# Patient Record
Sex: MALE | Race: WHITE | Employment: FULL TIME | URBAN - METROPOLITAN AREA
[De-identification: names, ages, dates, MRNs, and addresses within clinical notes are randomized per-mention and may not be internally consistent; named-entity substitution may affect disease eponyms.]

---

## 2025-05-07 ENCOUNTER — HOSPITAL ENCOUNTER (INPATIENT)
Age: 35
LOS: 5 days | Discharge: ANOTHER HOSPITAL | DRG: 836 | End: 2025-05-12
Attending: INTERNAL MEDICINE | Admitting: HOSPITALIST
Payer: COMMERCIAL

## 2025-05-07 ENCOUNTER — APPOINTMENT (OUTPATIENT)
Dept: ULTRASOUND IMAGING | Age: 35
End: 2025-05-07
Payer: COMMERCIAL

## 2025-05-07 ENCOUNTER — HOSPITAL ENCOUNTER (EMERGENCY)
Age: 35
Discharge: ANOTHER HOSPITAL | End: 2025-05-07
Attending: STUDENT IN AN ORGANIZED HEALTH CARE EDUCATION/TRAINING PROGRAM
Payer: COMMERCIAL

## 2025-05-07 ENCOUNTER — APPOINTMENT (OUTPATIENT)
Dept: CT IMAGING | Age: 35
End: 2025-05-07
Payer: COMMERCIAL

## 2025-05-07 ENCOUNTER — APPOINTMENT (OUTPATIENT)
Dept: GENERAL RADIOLOGY | Age: 35
End: 2025-05-07
Payer: COMMERCIAL

## 2025-05-07 VITALS
TEMPERATURE: 98.5 F | HEIGHT: 70 IN | BODY MASS INDEX: 25.77 KG/M2 | HEART RATE: 102 BPM | WEIGHT: 180 LBS | DIASTOLIC BLOOD PRESSURE: 73 MMHG | OXYGEN SATURATION: 94 % | SYSTOLIC BLOOD PRESSURE: 143 MMHG | RESPIRATION RATE: 21 BRPM

## 2025-05-07 DIAGNOSIS — D64.9 ANEMIA, UNSPECIFIED TYPE: ICD-10-CM

## 2025-05-07 DIAGNOSIS — C92.00 ACUTE MYELOID LEUKEMIA NOT HAVING ACHIEVED REMISSION (HCC): Primary | ICD-10-CM

## 2025-05-07 DIAGNOSIS — D72.829 LEUKOCYTOSIS, UNSPECIFIED TYPE: ICD-10-CM

## 2025-05-07 DIAGNOSIS — D75.839 THROMBOCYTOSIS: ICD-10-CM

## 2025-05-07 DIAGNOSIS — R53.83 OTHER FATIGUE: Primary | ICD-10-CM

## 2025-05-07 PROBLEM — C92.10 CML (CHRONIC MYELOID LEUKEMIA) (HCC): Status: ACTIVE | Noted: 2025-05-07

## 2025-05-07 LAB
ALBUMIN SERPL-MCNC: 4.2 G/DL (ref 3.5–5)
ALBUMIN/GLOB SERPL: 1.2 (ref 1–1.9)
ALP SERPL-CCNC: 90 U/L (ref 40–129)
ALT SERPL-CCNC: 17 U/L (ref 8–55)
ANION GAP SERPL CALC-SCNC: 14 MMOL/L (ref 7–16)
AST SERPL-CCNC: 32 U/L (ref 15–37)
BASOPHILS # BLD: 8.11 K/UL (ref 0–0.2)
BASOPHILS NFR BLD MANUAL: 2 % (ref 0–2)
BILIRUB SERPL-MCNC: 0.4 MG/DL (ref 0–1.2)
BILIRUB UR QL: NEGATIVE
BLASTS NFR BLD MANUAL: 10 %
BUN SERPL-MCNC: 19 MG/DL (ref 6–23)
CALCIUM SERPL-MCNC: 9.8 MG/DL (ref 8.8–10.2)
CHLORIDE SERPL-SCNC: 101 MMOL/L (ref 98–107)
CK SERPL-CCNC: 54 U/L (ref 21–215)
CO2 SERPL-SCNC: 26 MMOL/L (ref 20–29)
CREAT SERPL-MCNC: 0.98 MG/DL (ref 0.8–1.3)
DIFFERENTIAL METHOD BLD: ABNORMAL
EOSINOPHIL # BLD: 12.17 K/UL (ref 0–0.8)
EOSINOPHIL NFR BLD MANUAL: 3 % (ref 1–8)
ERYTHROCYTE [DISTWIDTH] IN BLOOD BY AUTOMATED COUNT: 17.2 % (ref 11.9–14.6)
FLUAV RNA SPEC QL NAA+PROBE: NOT DETECTED
FLUBV RNA SPEC QL NAA+PROBE: NOT DETECTED
GLOBULIN SER CALC-MCNC: 3.4 G/DL (ref 2.3–3.5)
GLUCOSE SERPL-MCNC: 109 MG/DL (ref 70–99)
GLUCOSE UR QL STRIP.AUTO: NEGATIVE MG/DL
HAV IGM SER QL: NONREACTIVE
HBV CORE IGM SER QL: NONREACTIVE
HBV SURFACE AG SER QL: NONREACTIVE
HCT VFR BLD AUTO: 24 % (ref 41.1–50.3)
HCV AB SER QL: NONREACTIVE
HGB BLD-MCNC: 8.3 G/DL (ref 13.6–17.2)
HIV 1+2 AB+HIV1 P24 AG SERPL QL IA: NONREACTIVE
HIV 1/2 RESULT COMMENT: NORMAL
KETONES UR-MCNC: NEGATIVE MG/DL
LACTATE SERPL-SCNC: 1 MMOL/L (ref 0.5–2)
LDH SERPL L TO P-CCNC: 1271 U/L (ref 127–281)
LEUKOCYTE ESTERASE UR QL STRIP: NEGATIVE
LYMPHOCYTES # BLD: 4.06 K/UL (ref 0.5–4.6)
LYMPHOCYTES NFR BLD MANUAL: 1 % (ref 16–44)
MAGNESIUM SERPL-MCNC: 2.2 MG/DL (ref 1.8–2.4)
MCH RBC QN AUTO: 35.3 PG (ref 26.1–32.9)
MCHC RBC AUTO-ENTMCNC: 34.6 G/DL (ref 31.4–35)
MCV RBC AUTO: 102.1 FL (ref 82–102)
METAMYELOCYTES NFR BLD MANUAL: 6 %
MONOCYTES # BLD: 4.06 K/UL (ref 0.1–1.3)
MONOCYTES NFR BLD MANUAL: 1 % (ref 3–9)
MYELOCYTES NFR BLD MANUAL: 17 %
NEUTS BAND NFR BLD MANUAL: 17 % (ref 0–6)
NEUTS SEG # BLD: 336.65 K/UL (ref 1.7–8.2)
NEUTS SEG NFR BLD MANUAL: 35 % (ref 47–75)
NITRITE UR QL: NEGATIVE
NRBC # BLD: 1.68 K/UL (ref 0–0.2)
PH UR: 6 (ref 5–9)
PLATELET # BLD AUTO: 1059 K/UL (ref 150–450)
PLATELET COMMENT: ABNORMAL
PMV BLD AUTO: 8.9 FL (ref 9.4–12.3)
POTASSIUM SERPL-SCNC: 3.8 MMOL/L (ref 3.5–5.1)
PROCALCITONIN SERPL-MCNC: 0.32 NG/ML (ref 0–0.1)
PROMYELOCYTES NFR BLD MANUAL: 8 %
PROT SERPL-MCNC: 7.5 G/DL (ref 6.3–8.2)
PROT UR QL: NEGATIVE MG/DL
RBC # BLD AUTO: 2.35 M/UL (ref 4.23–5.6)
RBC # UR STRIP: NEGATIVE
RBC MORPH BLD: ABNORMAL
SARS-COV-2 RDRP RESP QL NAA+PROBE: NOT DETECTED
SERVICE CMNT-IMP: NORMAL
SODIUM SERPL-SCNC: 141 MMOL/L (ref 136–145)
SOURCE: NORMAL
SP GR UR: 1.01 (ref 1–1.02)
URATE SERPL-MCNC: 7.8 MG/DL (ref 3.9–8.2)
UROBILINOGEN UR QL: 0.2 EU/DL (ref 0.2–1)
WBC # BLD AUTO: 405.6 K/UL (ref 4.3–11.1)
WBC MORPH BLD: ABNORMAL

## 2025-05-07 PROCEDURE — 93005 ELECTROCARDIOGRAM TRACING: CPT | Performed by: STUDENT IN AN ORGANIZED HEALTH CARE EDUCATION/TRAINING PROGRAM

## 2025-05-07 PROCEDURE — 83605 ASSAY OF LACTIC ACID: CPT

## 2025-05-07 PROCEDURE — 85025 COMPLETE CBC W/AUTO DIFF WBC: CPT

## 2025-05-07 PROCEDURE — 80053 COMPREHEN METABOLIC PANEL: CPT

## 2025-05-07 PROCEDURE — 2060000001 HC ICU INTERMEDIATE R&B-BMT

## 2025-05-07 PROCEDURE — 81003 URINALYSIS AUTO W/O SCOPE: CPT

## 2025-05-07 PROCEDURE — 87636 SARSCOV2 & INF A&B AMP PRB: CPT

## 2025-05-07 PROCEDURE — 84550 ASSAY OF BLOOD/URIC ACID: CPT

## 2025-05-07 PROCEDURE — 84145 PROCALCITONIN (PCT): CPT

## 2025-05-07 PROCEDURE — 87389 HIV-1 AG W/HIV-1&-2 AB AG IA: CPT

## 2025-05-07 PROCEDURE — 70450 CT HEAD/BRAIN W/O DYE: CPT

## 2025-05-07 PROCEDURE — 83615 LACTATE (LD) (LDH) ENZYME: CPT

## 2025-05-07 PROCEDURE — 83735 ASSAY OF MAGNESIUM: CPT

## 2025-05-07 PROCEDURE — 2500000003 HC RX 250 WO HCPCS: Performed by: HOSPITALIST

## 2025-05-07 PROCEDURE — 80074 ACUTE HEPATITIS PANEL: CPT

## 2025-05-07 PROCEDURE — 2580000003 HC RX 258: Performed by: HOSPITALIST

## 2025-05-07 PROCEDURE — 1170000000 HC RM PRIVATE ONCOLOGY

## 2025-05-07 PROCEDURE — 71046 X-RAY EXAM CHEST 2 VIEWS: CPT

## 2025-05-07 PROCEDURE — 93970 EXTREMITY STUDY: CPT

## 2025-05-07 PROCEDURE — 99285 EMERGENCY DEPT VISIT HI MDM: CPT

## 2025-05-07 PROCEDURE — 82550 ASSAY OF CK (CPK): CPT

## 2025-05-07 RX ORDER — SODIUM CHLORIDE 9 MG/ML
INJECTION, SOLUTION INTRAVENOUS PRN
Status: DISCONTINUED | OUTPATIENT
Start: 2025-05-07 | End: 2025-05-12 | Stop reason: HOSPADM

## 2025-05-07 RX ORDER — ACETAMINOPHEN 325 MG/1
650 TABLET ORAL EVERY 6 HOURS PRN
Status: DISCONTINUED | OUTPATIENT
Start: 2025-05-07 | End: 2025-05-12 | Stop reason: HOSPADM

## 2025-05-07 RX ORDER — ONDANSETRON 2 MG/ML
4 INJECTION INTRAMUSCULAR; INTRAVENOUS EVERY 6 HOURS PRN
Status: DISCONTINUED | OUTPATIENT
Start: 2025-05-07 | End: 2025-05-12 | Stop reason: HOSPADM

## 2025-05-07 RX ORDER — SODIUM CHLORIDE 0.9 % (FLUSH) 0.9 %
5-40 SYRINGE (ML) INJECTION PRN
Status: DISCONTINUED | OUTPATIENT
Start: 2025-05-07 | End: 2025-05-12 | Stop reason: HOSPADM

## 2025-05-07 RX ORDER — POLYETHYLENE GLYCOL 3350 17 G/17G
17 POWDER, FOR SOLUTION ORAL DAILY PRN
Status: DISCONTINUED | OUTPATIENT
Start: 2025-05-07 | End: 2025-05-12 | Stop reason: HOSPADM

## 2025-05-07 RX ORDER — MAGNESIUM SULFATE IN WATER 40 MG/ML
2000 INJECTION, SOLUTION INTRAVENOUS PRN
Status: DISCONTINUED | OUTPATIENT
Start: 2025-05-07 | End: 2025-05-12 | Stop reason: HOSPADM

## 2025-05-07 RX ORDER — ENOXAPARIN SODIUM 100 MG/ML
40 INJECTION SUBCUTANEOUS DAILY
Status: DISCONTINUED | OUTPATIENT
Start: 2025-05-08 | End: 2025-05-12 | Stop reason: HOSPADM

## 2025-05-07 RX ORDER — POTASSIUM CHLORIDE 7.45 MG/ML
10 INJECTION INTRAVENOUS PRN
Status: DISCONTINUED | OUTPATIENT
Start: 2025-05-07 | End: 2025-05-12 | Stop reason: HOSPADM

## 2025-05-07 RX ORDER — POTASSIUM CHLORIDE 1500 MG/1
40 TABLET, EXTENDED RELEASE ORAL PRN
Status: DISCONTINUED | OUTPATIENT
Start: 2025-05-07 | End: 2025-05-12 | Stop reason: HOSPADM

## 2025-05-07 RX ORDER — HYDROXYUREA 500 MG/1
1000 CAPSULE ORAL DAILY
Status: DISCONTINUED | OUTPATIENT
Start: 2025-05-08 | End: 2025-05-09

## 2025-05-07 RX ORDER — SODIUM CHLORIDE 0.9 % (FLUSH) 0.9 %
5-40 SYRINGE (ML) INJECTION EVERY 12 HOURS SCHEDULED
Status: DISCONTINUED | OUTPATIENT
Start: 2025-05-07 | End: 2025-05-12 | Stop reason: HOSPADM

## 2025-05-07 RX ORDER — SODIUM CHLORIDE 9 MG/ML
INJECTION, SOLUTION INTRAVENOUS CONTINUOUS
Status: DISCONTINUED | OUTPATIENT
Start: 2025-05-07 | End: 2025-05-08

## 2025-05-07 RX ORDER — ACETAMINOPHEN 650 MG/1
650 SUPPOSITORY RECTAL EVERY 6 HOURS PRN
Status: DISCONTINUED | OUTPATIENT
Start: 2025-05-07 | End: 2025-05-08

## 2025-05-07 RX ORDER — ONDANSETRON 4 MG/1
4 TABLET, ORALLY DISINTEGRATING ORAL EVERY 8 HOURS PRN
Status: DISCONTINUED | OUTPATIENT
Start: 2025-05-07 | End: 2025-05-12 | Stop reason: HOSPADM

## 2025-05-07 RX ADMIN — SODIUM CHLORIDE: 0.9 INJECTION, SOLUTION INTRAVENOUS at 21:33

## 2025-05-07 RX ADMIN — SODIUM CHLORIDE, PRESERVATIVE FREE 10 ML: 5 INJECTION INTRAVENOUS at 21:34

## 2025-05-07 ASSESSMENT — PAIN SCALES - GENERAL
PAINLEVEL_OUTOF10: 1
PAINLEVEL_OUTOF10: 0
PAINLEVEL_OUTOF10: 0

## 2025-05-07 ASSESSMENT — PAIN DESCRIPTION - LOCATION: LOCATION: HEAD

## 2025-05-07 ASSESSMENT — PAIN - FUNCTIONAL ASSESSMENT: PAIN_FUNCTIONAL_ASSESSMENT: 0-10

## 2025-05-07 NOTE — ED NOTES
TRANSFER - OUT REPORT:    Verbal report given to MARTHA Nation on Celestino Quesada  being [facility] for routine progression of patient care       Report consisted of patient's Situation, Background, Assessment and   Recommendations(SBAR).     Information from the following report(s) Nurse Handoff Report, ED Encounter Summary, ED SBAR, Adult Overview, MAR, Recent Results, and Neuro Assessment was reviewed with the receiving nurse.    [place] Fall Assessment:    Presents to emergency department  because of falls (Syncope, seizure, or loss of consciousness): No  Age > 70: No  Altered Mental Status, Intoxication with alcohol or substance confusion (Disorientation, impaired judgment, poor safety awaremess, or inability to follow instructions): No  Impaired Mobility: Ambulates or transfers with assistive devices or assistance; Unable to ambulate or transer.: No  Nursing Judgement: No          Lines:   Peripheral IV 05/07/25 Left Antecubital (Active)   Site Assessment Clean, dry & intact 05/07/25 1543   Line Status Blood return noted;Normal saline locked;Capped;Flushed;Specimen collected 05/07/25 1543   Phlebitis Assessment No symptoms 05/07/25 1543   Infiltration Assessment 0 05/07/25 1543   Dressing Status New dressing applied;Clean, dry & intact 05/07/25 1543   Dressing Type Transparent 05/07/25 1543   Dressing Intervention New 05/07/25 1543        Opportunity for questions and clarification was provided.      Patient transported with:  EMS

## 2025-05-07 NOTE — ED TRIAGE NOTES
Patient arrives ambulatory to the ED alone. States, \" I had blood work done yesterday. I was called and told to come to the ED. I got really sick two months ago and haven't feel well or normal for a couple of months. I was told my labs showed anemia, elevated WBC and high platelet  counts.    C/O frequent headaches, shortness of breath. 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

## 2025-05-07 NOTE — ED PROVIDER NOTES
control, adjustment of the mA and/or kVp according to  patient's size, iterative reconstruction.    COMPARISON: None    FINDINGS:  No areas of abnormal attenuation are seen in the brain. There is no CT evidence  of acute hemorrhage or infarction. The ventricles are normal in size. There are  no extra-axial fluid collections. No masses are seen. The sinuses are clear.  There are no bony lesions.      Impression    No CT evidence of acute intracranial abnormality.    Electronically signed by Barry Read   CBC with Auto Differential   Result Value Ref Range    .6 (HH) 4.3 - 11.1 K/uL    RBC 2.35 (L) 4.23 - 5.6 M/uL    Hemoglobin 8.3 (L) 13.6 - 17.2 g/dL    Hematocrit 24.0 (L) 41.1 - 50.3 %    .1 (H) 82.0 - 102.0 FL    MCH 35.3 (H) 26.1 - 32.9 PG    MCHC 34.6 31.4 - 35.0 g/dL    RDW 17.2 (H) 11.9 - 14.6 %    Platelets 1,059 (HH) 150 - 450 K/uL    MPV 8.9 (L) 9.4 - 12.3 FL    nRBC 1.68 (H) 0.0 - 0.2 K/uL    Neutrophils % 35 (L) 47 - 75 %    Bands 17 (H) 0 - 6 %    Lymphocytes 1 (L) 16 - 44 %    Monocytes % 1 (L) 3 - 9 %    Eosinophils % 3 1 - 8 %    Basophils % 2 0 - 2 %    Metamyelocytes 6 %    Myelocytes 17 %    Promyelocytes 8 %    Blasts 10 %    Neutrophils Absolute 336.65 (H) 1.7 - 8.2 K/UL    Lymphocytes Absolute 4.06 0.5 - 4.6 K/UL    Monocytes Absolute 4.06 (H) 0.1 - 1.3 K/UL    Eosinophils Absolute 12.17 (H) 0.0 - 0.8 K/UL    Basophils Absolute 8.11 (H) 0.0 - 0.2 K/UL    RBC Comment SLIGHT  POLYCHROMASIA        RBC Comment SLIGHT  MACROCYTOSIS        RBC Comment SLIGHT  ANISOCYTOSIS        RBC Comment FEW NUCLEATED RBC PPRESENT     WBC Comment Result Confirmed By Smear      Platelet Comment MARKED      Differential Type MANUAL     Comprehensive Metabolic Panel   Result Value Ref Range    Sodium 141 136 - 145 mmol/L    Potassium 3.8 3.5 - 5.1 mmol/L    Chloride 101 98 - 107 mmol/L    CO2 26 20 - 29 mmol/L    Anion Gap 14 7 - 16 mmol/L    Glucose 109 (H) 70 - 99 mg/dL    BUN 19 6 - 23 MG/DL

## 2025-05-07 NOTE — H&P
Hospitalist History and Physical   Admit Date:  No admission date for patient encounter.   Name:  Celestino Quesada   Age:  35 y.o.  Sex:  male  :  1990   MRN:  [id number]   Room:  Room/bed info not found    Presenting/Chief Complaint: No chief complaint on file.     Reason(s) for Admission: thrombocytopenia     History of Present Illness:   Celestino Quesada is a 35 y.o. male with no significant prior past medical history, presented to the ER after he was called by PCP office for abnormal labs.  Patient reports that he has been feeling weak and tired for the last 2 months.  He is a runner and he could not complete the 5K run recently, because of shortness of breath.  Lab work at PCP office shows elevated white count low hemoglobin and thrombocytosis and therefore was advised to present to the ER.  Patient reports feeling weak and tired.  Shortness of breath with exertion.  He also has headaches.  No fever no chills.  No chest pain.  No nausea no vomiting.  No tingling numbness or weakness of extremities.    ER course  Patient is afebrile.  Hemodynamically stable.    CMP fairly unremarkable.    Procalcitonin 0.32.    Lactate dehydrogenase elevated at 1271.  Uric acid 7.8.    CBC remarkable for white count of 405, hemoglobin 8.3, platelet count 1059.    Duplex ultrasound bilateral lower extremities negative for DVT.  Suspect hematoma in the left upper medial calf at the area of clinical interest measuring 3.3 x 1 x 2.8 cm.    CT head done on admission negative for acute findings.    Chest x-ray was reviewed by me negative for acute cardiopulmonary findings.    Hematology was contacted by ER physician.    Patient admitted to [facility] for further evaluation and management of suspected CML.    Assessment & Plan:     This is a 35-year-old male with    Suspected CML  Hematology consulted.  Appreciate recommendations.  Spoke with Dr. Huerta.  Recommends transfer to [facility].  Start hydroxyurea.  N.p.o. after midnight for

## 2025-05-07 NOTE — H&P
Differential Type MANUAL     Comprehensive Metabolic Panel    Collection Time: 05/07/25  3:39 PM   Result Value Ref Range    Sodium 141 136 - 145 mmol/L    Potassium 3.8 3.5 - 5.1 mmol/L    Chloride 101 98 - 107 mmol/L    CO2 26 20 - 29 mmol/L    Anion Gap 14 7 - 16 mmol/L    Glucose 109 (H) 70 - 99 mg/dL    BUN 19 6 - 23 MG/DL    Creatinine 0.98 0.80 - 1.30 MG/DL    Est, Glom Filt Rate >90 >60 ml/min/1.73m2    Calcium 9.8 8.8 - 10.2 MG/DL    Total Bilirubin 0.4 0.0 - 1.2 MG/DL    ALT 17 8 - 55 U/L    AST 32 15 - 37 U/L    Alk Phosphatase 90 40 - 129 U/L    Total Protein 7.5 6.3 - 8.2 g/dL    Albumin 4.2 3.5 - 5.0 g/dL    Globulin 3.4 2.3 - 3.5 g/dL    Albumin/Globulin Ratio 1.2 1.0 - 1.9     Magnesium    Collection Time: 05/07/25  3:39 PM   Result Value Ref Range    Magnesium 2.2 1.8 - 2.4 mg/dL   Lactic Acid    Collection Time: 05/07/25  3:39 PM   Result Value Ref Range    Lactic Acid 1.0 0.5 - 2.0 mmol/L   Procalcitonin    Collection Time: 05/07/25  3:39 PM   Result Value Ref Range    Procalcitonin 0.32 (H) 0.00 - 0.10 ng/mL   Lactate Dehydrogenase    Collection Time: 05/07/25  3:39 PM   Result Value Ref Range    LD 1,271 (H) 127 - 281 U/L   Uric Acid    Collection Time: 05/07/25  3:39 PM   Result Value Ref Range    Uric Acid 7.8 3.9 - 8.2 MG/DL   POCT Urinalysis no Micro    Collection Time: 05/07/25  6:34 PM   Result Value Ref Range    Specific Gravity, Urine, POC 1.010 1.001 - 1.023      pH, Urine, POC 6.0 5.0 - 9.0      Protein, Urine, POC Negative NEG mg/dL    Glucose, UA POC Negative NEG mg/dL    Ketones, Urine, POC Negative NEG mg/dL    Bilirubin, Urine, POC Negative NEG      Blood, UA POC Negative NEG      URINE UROBILINOGEN POC 0.2 0.2 - 1.0 EU/dL    Nitrite, Urine, POC Negative NEG      Leukocyte Est, UA POC Negative NEG      Performed by: Matilde Toure        No results for input(s): \"COVID19\" in the last 72 hours.    CT HEAD WO CONTRAST  Result Date: 5/7/2025  Head CT INDICATION: headache

## 2025-05-08 ENCOUNTER — APPOINTMENT (OUTPATIENT)
Dept: ULTRASOUND IMAGING | Age: 35
DRG: 836 | End: 2025-05-08
Attending: INTERNAL MEDICINE
Payer: COMMERCIAL

## 2025-05-08 ENCOUNTER — APPOINTMENT (OUTPATIENT)
Dept: CT IMAGING | Age: 35
DRG: 836 | End: 2025-05-08
Attending: INTERNAL MEDICINE
Payer: COMMERCIAL

## 2025-05-08 ENCOUNTER — APPOINTMENT (OUTPATIENT)
Dept: NON INVASIVE DIAGNOSTICS | Age: 35
DRG: 836 | End: 2025-05-08
Attending: INTERNAL MEDICINE
Payer: COMMERCIAL

## 2025-05-08 PROBLEM — D53.9 MACROCYTIC ANEMIA: Status: ACTIVE | Noted: 2025-05-07

## 2025-05-08 PROBLEM — C92.10 CML (CHRONIC MYELOCYTIC LEUKEMIA) (HCC): Status: ACTIVE | Noted: 2025-05-08

## 2025-05-08 PROBLEM — D72.829 LEUKOCYTOSIS: Status: ACTIVE | Noted: 2025-05-08

## 2025-05-08 PROBLEM — C92.00 AML (ACUTE MYELOBLASTIC LEUKEMIA) (HCC): Status: ACTIVE | Noted: 2025-05-07

## 2025-05-08 LAB
25(OH)D3 SERPL-MCNC: 39.5 NG/ML (ref 30–100)
ALBUMIN SERPL-MCNC: 3.7 G/DL (ref 3.5–5)
ALBUMIN SERPL-MCNC: 4.3 G/DL (ref 3.5–5)
ALBUMIN/GLOB SERPL: 1.2 (ref 1–1.9)
ALBUMIN/GLOB SERPL: 1.4 (ref 1–1.9)
ALP SERPL-CCNC: 86 U/L (ref 40–129)
ALP SERPL-CCNC: 88 U/L (ref 40–129)
ALT SERPL-CCNC: 16 U/L (ref 8–55)
ALT SERPL-CCNC: 18 U/L (ref 8–55)
ANION GAP SERPL CALC-SCNC: 11 MMOL/L (ref 7–16)
ANION GAP SERPL CALC-SCNC: 16 MMOL/L (ref 7–16)
APTT PPP: 37.9 SEC (ref 23.3–37.4)
AST SERPL-CCNC: 31 U/L (ref 15–37)
AST SERPL-CCNC: 31 U/L (ref 15–37)
BASOPHILS # BLD: 10.84 K/UL (ref 0–0.2)
BASOPHILS # BLD: 19.12 K/UL (ref 0–0.2)
BASOPHILS NFR BLD MANUAL: 3 % (ref 0–2)
BASOPHILS NFR BLD MANUAL: 5 % (ref 0–2)
BILIRUB SERPL-MCNC: 0.3 MG/DL (ref 0–1.2)
BILIRUB SERPL-MCNC: 0.4 MG/DL (ref 0–1.2)
BLASTS NFR BLD MANUAL: 8 %
BLASTS NFR BLD MANUAL: 9 %
BONE MARROW PREP & WRIGHT STAIN: NORMAL
BUN SERPL-MCNC: 15 MG/DL (ref 6–23)
BUN SERPL-MCNC: 17 MG/DL (ref 6–23)
CALCIUM SERPL-MCNC: 10.2 MG/DL (ref 8.8–10.2)
CALCIUM SERPL-MCNC: 9.4 MG/DL (ref 8.8–10.2)
CHLORIDE SERPL-SCNC: 104 MMOL/L (ref 98–107)
CHLORIDE SERPL-SCNC: 107 MMOL/L (ref 98–107)
CO2 SERPL-SCNC: 22 MMOL/L (ref 20–29)
CO2 SERPL-SCNC: 26 MMOL/L (ref 20–29)
CREAT SERPL-MCNC: 0.94 MG/DL (ref 0.8–1.3)
CREAT SERPL-MCNC: 1.12 MG/DL (ref 0.8–1.3)
D DIMER PPP FEU-MCNC: 0.31 UG/ML(FEU)
DIFFERENTIAL METHOD BLD: ABNORMAL
DIFFERENTIAL METHOD BLD: ABNORMAL
ECHO AO ROOT DIAM: 3 CM
ECHO AO ROOT INDEX: 1.51 CM/M2
ECHO AV AREA PEAK VELOCITY: 3.1 CM2
ECHO AV AREA VTI: 2.3 CM2
ECHO AV AREA/BSA PEAK VELOCITY: 1.6 CM2/M2
ECHO AV AREA/BSA VTI: 1.2 CM2/M2
ECHO AV MEAN GRADIENT: 7 MMHG
ECHO AV MEAN VELOCITY: 1.3 M/S
ECHO AV PEAK GRADIENT: 11 MMHG
ECHO AV PEAK GRADIENT: 11 MMHG
ECHO AV PEAK VELOCITY: 1.7 M/S
ECHO AV VELOCITY RATIO: 0.94
ECHO AV VTI: 34 CM
ECHO AV VTI: 34 CM
ECHO BSA: 2.01 M2
ECHO EST RA PRESSURE: 3 MMHG
ECHO IVC PROX: 1.4 CM
ECHO LA AREA 2C: 18.5 CM2
ECHO LA AREA 4C: 20.8 CM2
ECHO LA DIAMETER INDEX: 1.91 CM/M2
ECHO LA DIAMETER: 3.8 CM
ECHO LA MAJOR AXIS: 6 CM
ECHO LA MINOR AXIS: 4.9 CM
ECHO LA TO AORTIC ROOT RATIO: 1.27
ECHO LA VOL BP: 58 ML (ref 18–58)
ECHO LA VOL MOD A2C: 53 ML (ref 18–58)
ECHO LA VOL MOD A4C: 52 ML (ref 18–58)
ECHO LA VOL/BSA BIPLANE: 29 ML/M2 (ref 16–34)
ECHO LA VOLUME INDEX MOD A2C: 27 ML/M2 (ref 16–34)
ECHO LA VOLUME INDEX MOD A4C: 26 ML/M2 (ref 16–34)
ECHO LV E' LATERAL VELOCITY: 27.7 CM/S
ECHO LV E' SEPTAL VELOCITY: 13.2 CM/S
ECHO LV EDV A2C: 173 ML
ECHO LV EDV A4C: 182 ML
ECHO LV EDV INDEX A4C: 91 ML/M2
ECHO LV EDV NDEX A2C: 87 ML/M2
ECHO LV EF PHYSICIAN: 65 %
ECHO LV EJECTION FRACTION A2C: 65 %
ECHO LV EJECTION FRACTION A4C: 65 %
ECHO LV EJECTION FRACTION BIPLANE: 65 % (ref 55–100)
ECHO LV ESV A2C: 60 ML
ECHO LV ESV A4C: 64 ML
ECHO LV ESV INDEX A2C: 30 ML/M2
ECHO LV ESV INDEX A4C: 32 ML/M2
ECHO LV FRACTIONAL SHORTENING: 38 % (ref 28–44)
ECHO LV GLOBAL LONGITUDINAL STRAIN (GLS): -21.5 %
ECHO LV GLOBAL LONGITUDINAL STRAIN (GLS): -21.6 %
ECHO LV GLOBAL LONGITUDINAL STRAIN (GLS): -22.1 %
ECHO LV GLOBAL LONGITUDINAL STRAIN (GLS): -23.3 %
ECHO LV INTERNAL DIMENSION DIASTOLE INDEX: 2.66 CM/M2
ECHO LV INTERNAL DIMENSION DIASTOLIC: 5.3 CM (ref 4.2–5.9)
ECHO LV INTERNAL DIMENSION SYSTOLIC INDEX: 1.66 CM/M2
ECHO LV INTERNAL DIMENSION SYSTOLIC: 3.3 CM
ECHO LV IVSD: 1 CM (ref 0.6–1)
ECHO LV MASS 2D: 200.4 G (ref 88–224)
ECHO LV MASS INDEX 2D: 100.7 G/M2 (ref 49–115)
ECHO LV POSTERIOR WALL DIASTOLIC: 1 CM (ref 0.6–1)
ECHO LV RELATIVE WALL THICKNESS RATIO: 0.38
ECHO LVOT AREA: 3.1 CM2
ECHO LVOT DIAM: 2 CM
ECHO LVOT MEAN GRADIENT: 5 MMHG
ECHO LVOT PEAK GRADIENT: 11 MMHG
ECHO LVOT PEAK VELOCITY: 1.6 M/S
ECHO LVOT STROKE VOLUME INDEX: 39.9 ML/M2
ECHO LVOT SV: 79.4 ML
ECHO LVOT VTI: 25.3 CM
ECHO MV A VELOCITY: 0.52 M/S
ECHO MV AREA VTI: 2.6 CM2
ECHO MV E DECELERATION TIME (DT): 157 MS
ECHO MV E VELOCITY: 1.33 M/S
ECHO MV E/A RATIO: 2.56
ECHO MV E/E' LATERAL: 4.8
ECHO MV E/E' RATIO (AVERAGED): 7.44
ECHO MV E/E' SEPTAL: 10.08
ECHO MV LVOT VTI INDEX: 1.21
ECHO MV MAX VELOCITY: 1.4 M/S
ECHO MV MEAN GRADIENT: 4 MMHG
ECHO MV MEAN VELOCITY: 0.9 M/S
ECHO MV PEAK GRADIENT: 8 MMHG
ECHO MV VTI: 30.6 CM
ECHO PV ACCELERATION TIME (AT): 87 MS
ECHO PV MAX VELOCITY: 1.6 M/S
ECHO PV PEAK GRADIENT: 10 MMHG
ECHO PV PEAK GRADIENT: 10 MMHG
ECHO RIGHT VENTRICULAR SYSTOLIC PRESSURE (RVSP): 17 MMHG
ECHO RV BASAL DIMENSION: 3.5 CM
ECHO RV LONGITUDINAL DIMENSION: 7.7 CM
ECHO RV MID DIMENSION: 2.4 CM
ECHO RV TAPSE: 3.4 CM (ref 1.7–?)
ECHO TV REGURGITANT MAX VELOCITY: 1.9 M/S
ECHO TV REGURGITANT PEAK GRADIENT: 14 MMHG
EKG ATRIAL RATE: 99 BPM
EKG DIAGNOSIS: NORMAL
EKG P AXIS: 63 DEGREES
EKG P-R INTERVAL: 132 MS
EKG Q-T INTERVAL: 364 MS
EKG QRS DURATION: 93 MS
EKG QTC CALCULATION (BAZETT): 465 MS
EKG R AXIS: -6 DEGREES
EKG T AXIS: 53 DEGREES
EKG VENTRICULAR RATE: 98 BPM
EOSINOPHIL # BLD: 18.06 K/UL (ref 0–0.8)
EOSINOPHIL # BLD: 7.65 K/UL (ref 0–0.8)
EOSINOPHIL NFR BLD MANUAL: 2 % (ref 1–8)
EOSINOPHIL NFR BLD MANUAL: 5 % (ref 1–8)
ERYTHROCYTE [DISTWIDTH] IN BLOOD BY AUTOMATED COUNT: 17.4 % (ref 11.9–14.6)
ERYTHROCYTE [DISTWIDTH] IN BLOOD BY AUTOMATED COUNT: 17.5 % (ref 11.9–14.6)
FERRITIN SERPL-MCNC: 1225 NG/ML (ref 8–388)
FIBRINOGEN PPP-MCNC: 435 MG/DL (ref 190–501)
FOLATE SERPL-MCNC: 9.2 NG/ML (ref 3.1–17.5)
GLOBULIN SER CALC-MCNC: 3.1 G/DL (ref 2.3–3.5)
GLOBULIN SER CALC-MCNC: 3.1 G/DL (ref 2.3–3.5)
GLUCOSE SERPL-MCNC: 129 MG/DL (ref 70–99)
GLUCOSE SERPL-MCNC: 83 MG/DL (ref 70–99)
HCT VFR BLD AUTO: 23 % (ref 41.1–50.3)
HCT VFR BLD AUTO: 25.4 % (ref 41.1–50.3)
HGB BLD-MCNC: 8.1 G/DL (ref 13.6–17.2)
HGB BLD-MCNC: 8.8 G/DL (ref 13.6–17.2)
HGB RETIC QN AUTO: 34 PG (ref 29–35)
IMM RETICS NFR: 28.3 % (ref 2.3–13.4)
INR PPP: 1.2
IRON SATN MFR SERPL: 30 % (ref 20–50)
IRON SERPL-MCNC: 75 UG/DL (ref 35–100)
LDH SERPL L TO P-CCNC: 1165 U/L (ref 127–281)
LYMPHOCYTES # BLD: 11.47 K/UL (ref 0.5–4.6)
LYMPHOCYTES # BLD: 32.51 K/UL (ref 0.5–4.6)
LYMPHOCYTES NFR BLD MANUAL: 3 % (ref 16–44)
LYMPHOCYTES NFR BLD MANUAL: 9 % (ref 16–44)
MAGNESIUM SERPL-MCNC: 2.2 MG/DL (ref 1.8–2.4)
MCH RBC QN AUTO: 34.6 PG (ref 26.1–32.9)
MCH RBC QN AUTO: 36.3 PG (ref 26.1–32.9)
MCHC RBC AUTO-ENTMCNC: 34.6 G/DL (ref 31.4–35)
MCHC RBC AUTO-ENTMCNC: 35.2 G/DL (ref 31.4–35)
MCV RBC AUTO: 100 FL (ref 82–102)
MCV RBC AUTO: 103.1 FL (ref 82–102)
METAMYELOCYTES NFR BLD MANUAL: 11 %
METAMYELOCYTES NFR BLD MANUAL: 11 %
MONOCYTES # BLD: 10.84 K/UL (ref 0.1–1.3)
MONOCYTES # BLD: 15.29 K/UL (ref 0.1–1.3)
MONOCYTES NFR BLD MANUAL: 3 % (ref 3–9)
MONOCYTES NFR BLD MANUAL: 4 % (ref 3–9)
MYELOCYTES NFR BLD MANUAL: 5 %
MYELOCYTES NFR BLD MANUAL: 8 %
NEUTS BAND NFR BLD MANUAL: 7 % (ref 0–10)
NEUTS BAND NFR BLD MANUAL: 8 % (ref 0–10)
NEUTS SEG # BLD: 256.45 K/UL (ref 1.7–8.2)
NEUTS SEG # BLD: 298.19 K/UL (ref 1.7–8.2)
NEUTS SEG NFR BLD MANUAL: 37 % (ref 47–75)
NEUTS SEG NFR BLD MANUAL: 38 % (ref 47–75)
NRBC # BLD: 1.03 K/UL (ref 0–0.2)
NRBC # BLD: 1.23 K/UL (ref 0–0.2)
PATH REV BLD -IMP: NORMAL
PHOSPHATE SERPL-MCNC: 3.8 MG/DL (ref 2.5–4.5)
PHOSPHATE SERPL-MCNC: 4.3 MG/DL (ref 2.5–4.5)
PLATELET # BLD AUTO: 1070 K/UL (ref 150–450)
PLATELET # BLD AUTO: 1263 K/UL (ref 150–450)
PLATELET COMMENT: ABNORMAL
PLATELET COMMENT: ABNORMAL
PMV BLD AUTO: 9.1 FL (ref 9.4–12.3)
PMV BLD AUTO: 9.2 FL (ref 9.4–12.3)
POTASSIUM SERPL-SCNC: 3.5 MMOL/L (ref 3.5–5.1)
POTASSIUM SERPL-SCNC: 3.7 MMOL/L (ref 3.5–5.1)
PROMYELOCYTES NFR BLD MANUAL: 11 %
PROMYELOCYTES NFR BLD MANUAL: 13 %
PROT SERPL-MCNC: 6.7 G/DL (ref 6.3–8.2)
PROT SERPL-MCNC: 7.4 G/DL (ref 6.3–8.2)
PROTHROMBIN TIME: 16 SEC (ref 11.3–14.9)
RBC # BLD AUTO: 2.23 M/UL (ref 4.23–5.6)
RBC # BLD AUTO: 2.54 M/UL (ref 4.23–5.6)
RBC MORPH BLD: ABNORMAL
RETICS # AUTO: 0.09 M/UL (ref 0.03–0.1)
RETICS/RBC NFR AUTO: 2.9 % (ref 0.3–2)
SODIUM SERPL-SCNC: 142 MMOL/L (ref 136–145)
SODIUM SERPL-SCNC: 143 MMOL/L (ref 136–145)
TIBC SERPL-MCNC: 253 UG/DL (ref 240–450)
UIBC SERPL-MCNC: 178 UG/DL (ref 112–347)
URATE SERPL-MCNC: 6.8 MG/DL (ref 3.9–8.2)
URATE SERPL-MCNC: 7.5 MG/DL (ref 3.9–8.2)
VIT B12 SERPL-MCNC: >4000 PG/ML (ref 193–986)
WBC # BLD AUTO: 361.2 K/UL (ref 4.3–11.1)
WBC # BLD AUTO: 382.3 K/UL (ref 4.3–11.1)
WBC MORPH BLD: ABNORMAL
WBC MORPH BLD: ABNORMAL

## 2025-05-08 PROCEDURE — 83615 LACTATE (LD) (LDH) ENZYME: CPT

## 2025-05-08 PROCEDURE — 88311 DECALCIFY TISSUE: CPT

## 2025-05-08 PROCEDURE — 85730 THROMBOPLASTIN TIME PARTIAL: CPT

## 2025-05-08 PROCEDURE — 82728 ASSAY OF FERRITIN: CPT

## 2025-05-08 PROCEDURE — 93010 ELECTROCARDIOGRAM REPORT: CPT | Performed by: INTERNAL MEDICINE

## 2025-05-08 PROCEDURE — 1170000000 HC RM PRIVATE ONCOLOGY

## 2025-05-08 PROCEDURE — 82746 ASSAY OF FOLIC ACID SERUM: CPT

## 2025-05-08 PROCEDURE — 88342 IMHCHEM/IMCYTCHM 1ST ANTB: CPT

## 2025-05-08 PROCEDURE — 81207 BCR/ABL1 GENE MINOR BP: CPT

## 2025-05-08 PROCEDURE — 82607 VITAMIN B-12: CPT

## 2025-05-08 PROCEDURE — 93356 MYOCRD STRAIN IMG SPCKL TRCK: CPT | Performed by: INTERNAL MEDICINE

## 2025-05-08 PROCEDURE — 76700 US EXAM ABDOM COMPLETE: CPT

## 2025-05-08 PROCEDURE — 6370000000 HC RX 637 (ALT 250 FOR IP): Performed by: NURSE PRACTITIONER

## 2025-05-08 PROCEDURE — 93306 TTE W/DOPPLER COMPLETE: CPT | Performed by: INTERNAL MEDICINE

## 2025-05-08 PROCEDURE — 84100 ASSAY OF PHOSPHORUS: CPT

## 2025-05-08 PROCEDURE — 99223 1ST HOSP IP/OBS HIGH 75: CPT | Performed by: STUDENT IN AN ORGANIZED HEALTH CARE EDUCATION/TRAINING PROGRAM

## 2025-05-08 PROCEDURE — 85046 RETICYTE/HGB CONCENTRATE: CPT

## 2025-05-08 PROCEDURE — 83550 IRON BINDING TEST: CPT

## 2025-05-08 PROCEDURE — APPSS60 APP SPLIT SHARED TIME 46-60 MINUTES: Performed by: NURSE PRACTITIONER

## 2025-05-08 PROCEDURE — 80053 COMPREHEN METABOLIC PANEL: CPT

## 2025-05-08 PROCEDURE — 77012 CT SCAN FOR NEEDLE BIOPSY: CPT

## 2025-05-08 PROCEDURE — 85379 FIBRIN DEGRADATION QUANT: CPT

## 2025-05-08 PROCEDURE — 36415 COLL VENOUS BLD VENIPUNCTURE: CPT

## 2025-05-08 PROCEDURE — 93306 TTE W/DOPPLER COMPLETE: CPT

## 2025-05-08 PROCEDURE — 88313 SPECIAL STAINS GROUP 2: CPT

## 2025-05-08 PROCEDURE — 88305 TISSUE EXAM BY PATHOLOGIST: CPT

## 2025-05-08 PROCEDURE — 07DR3ZX EXTRACTION OF ILIAC BONE MARROW, PERCUTANEOUS APPROACH, DIAGNOSTIC: ICD-10-PCS | Performed by: INTERNAL MEDICINE

## 2025-05-08 PROCEDURE — 83540 ASSAY OF IRON: CPT

## 2025-05-08 PROCEDURE — 99152 MOD SED SAME PHYS/QHP 5/>YRS: CPT

## 2025-05-08 PROCEDURE — 82306 VITAMIN D 25 HYDROXY: CPT

## 2025-05-08 PROCEDURE — 85025 COMPLETE CBC W/AUTO DIFF WBC: CPT

## 2025-05-08 PROCEDURE — 84550 ASSAY OF BLOOD/URIC ACID: CPT

## 2025-05-08 PROCEDURE — 85610 PROTHROMBIN TIME: CPT

## 2025-05-08 PROCEDURE — 6370000000 HC RX 637 (ALT 250 FOR IP): Performed by: HOSPITALIST

## 2025-05-08 PROCEDURE — 6360000002 HC RX W HCPCS: Performed by: RADIOLOGY

## 2025-05-08 PROCEDURE — 83735 ASSAY OF MAGNESIUM: CPT

## 2025-05-08 PROCEDURE — 85384 FIBRINOGEN ACTIVITY: CPT

## 2025-05-08 PROCEDURE — 81206 BCR/ABL1 GENE MAJOR BP: CPT

## 2025-05-08 PROCEDURE — 83010 ASSAY OF HAPTOGLOBIN QUANT: CPT

## 2025-05-08 RX ORDER — DIPHENHYDRAMINE HYDROCHLORIDE 50 MG/ML
INJECTION, SOLUTION INTRAMUSCULAR; INTRAVENOUS PRN
Status: COMPLETED | OUTPATIENT
Start: 2025-05-08 | End: 2025-05-08

## 2025-05-08 RX ORDER — FENTANYL CITRATE 50 UG/ML
INJECTION, SOLUTION INTRAMUSCULAR; INTRAVENOUS PRN
Status: COMPLETED | OUTPATIENT
Start: 2025-05-08 | End: 2025-05-08

## 2025-05-08 RX ORDER — SODIUM CHLORIDE 9 MG/ML
INJECTION, SOLUTION INTRAVENOUS CONTINUOUS
Status: DISCONTINUED | OUTPATIENT
Start: 2025-05-08 | End: 2025-05-10

## 2025-05-08 RX ORDER — MORPHINE SULFATE 2 MG/ML
2 INJECTION, SOLUTION INTRAMUSCULAR; INTRAVENOUS EVERY 4 HOURS PRN
Refills: 0 | Status: DISCONTINUED | OUTPATIENT
Start: 2025-05-08 | End: 2025-05-12 | Stop reason: HOSPADM

## 2025-05-08 RX ORDER — HYDROCODONE BITARTRATE AND ACETAMINOPHEN 5; 325 MG/1; MG/1
1 TABLET ORAL EVERY 6 HOURS PRN
Refills: 0 | Status: DISCONTINUED | OUTPATIENT
Start: 2025-05-08 | End: 2025-05-12 | Stop reason: HOSPADM

## 2025-05-08 RX ORDER — MIDAZOLAM HYDROCHLORIDE 1 MG/ML
INJECTION, SOLUTION INTRAMUSCULAR; INTRAVENOUS PRN
Status: COMPLETED | OUTPATIENT
Start: 2025-05-08 | End: 2025-05-08

## 2025-05-08 RX ORDER — LIDOCAINE HYDROCHLORIDE 10 MG/ML
INJECTION, SOLUTION EPIDURAL; INFILTRATION; INTRACAUDAL; PERINEURAL PRN
Status: COMPLETED | OUTPATIENT
Start: 2025-05-08 | End: 2025-05-08

## 2025-05-08 RX ORDER — ALLOPURINOL 300 MG/1
300 TABLET ORAL DAILY
Status: DISCONTINUED | OUTPATIENT
Start: 2025-05-08 | End: 2025-05-12 | Stop reason: HOSPADM

## 2025-05-08 RX ADMIN — FENTANYL CITRATE 100 MCG: 50 INJECTION, SOLUTION INTRAMUSCULAR; INTRAVENOUS at 15:01

## 2025-05-08 RX ADMIN — ALLOPURINOL 300 MG: 300 TABLET ORAL at 10:10

## 2025-05-08 RX ADMIN — MIDAZOLAM 1 MG: 1 INJECTION INTRAMUSCULAR; INTRAVENOUS at 15:07

## 2025-05-08 RX ADMIN — MIDAZOLAM 2 MG: 1 INJECTION INTRAMUSCULAR; INTRAVENOUS at 15:01

## 2025-05-08 RX ADMIN — LIDOCAINE HYDROCHLORIDE 5 ML: 10 INJECTION, SOLUTION EPIDURAL; INFILTRATION; INTRACAUDAL; PERINEURAL at 15:15

## 2025-05-08 RX ADMIN — FENTANYL CITRATE 50 MCG: 50 INJECTION, SOLUTION INTRAMUSCULAR; INTRAVENOUS at 15:07

## 2025-05-08 RX ADMIN — FENTANYL CITRATE 50 MCG: 50 INJECTION, SOLUTION INTRAMUSCULAR; INTRAVENOUS at 15:10

## 2025-05-08 RX ADMIN — DIPHENHYDRAMINE HYDROCHLORIDE 50 MG: 50 INJECTION, SOLUTION INTRAMUSCULAR; INTRAVENOUS at 15:01

## 2025-05-08 RX ADMIN — HYDROXYUREA 1000 MG: 500 CAPSULE ORAL at 07:56

## 2025-05-08 RX ADMIN — MIDAZOLAM 1 MG: 1 INJECTION INTRAMUSCULAR; INTRAVENOUS at 15:11

## 2025-05-08 ASSESSMENT — PAIN SCALES - GENERAL
PAINLEVEL_OUTOF10: 0

## 2025-05-08 NOTE — CARE COORDINATION
34 y/o M admitted with suspected new onset leukemia.  Pt was off floor at time of assessment. Pt's wife answered assessment questions.  Lives with his wife and children in a one story home with a level entry  Independent with ADLs  Insured, has prescription coverage, is established with a PCP  Current , Wife will bring him home at discharge.  Pt works FT  DME: none  Denies using any outside services.    Pt and family plan to have him go home at discharge.     Will continue to follow and make referrals as needed based on clinical course.    ASSESSMENT NOTE    Attending Physician: Ely Sanchez MD  Admit Problem: CML (chronic myeloid leukemia) (HCC) [C92.10]  Date/Time of Admission: 5/7/2025  9:04 PM  Problem List:  Patient Active Problem List   Diagnosis    Suspected new onset leukemia (HCC)    Macrocytic anemia    Thrombocytosis, severe        05/08/25 1200   Service Assessment   Patient Orientation Alert and Oriented   Cognition Alert   History Provided By Significant Other  (pt was off the floor)   Primary Caregiver Self   Support Systems Spouse/Significant Other;Parent;Family Members   Patient's Healthcare Decision Maker is: Legal Next of Kin   PCP Verified by CM Yes   Last Visit to PCP Within last 3 months   Prior Functional Level Independent in ADLs/IADLs   Current Functional Level Independent in ADLs/IADLs   Can patient return to prior living arrangement Yes   Ability to make needs known: Good   Family able to assist with home care needs: Yes   Would you like for me to discuss the discharge plan with any other family members/significant others, and if so, who? Yes  (spouse)   Financial Resources Other (Comment)  ([place])   Community Resources None   CM/SW Referral Other (see comment)   Social/Functional History   Lives With Spouse   Type of Home House   Home Layout One level;Able to Live on Main level with bedroom/bathroom   Home Access Level entry   Prior Level of Assist for ADLs Independent

## 2025-05-08 NOTE — PRE SEDATION
Sedation Pre-Procedure Note    Patient Name: Celestino Quesada   YOB: 1990  Room/Bed: [facility]  Medical Record Number: [id number]  Date: 5/8/2025   Time: 2:35 PM       Indication:  Leukocytosis    Consent: I have discussed with the patient and/or the patient representative the indication, alternatives, and the possible risks and/or complications of the planned procedure and the anesthesia methods. The patient and/or patient representative appear to understand and agree to proceed.    Vital Signs:   Vitals:    05/08/25 1422   BP: (!) 148/83   Pulse: 85   Resp: 18   Temp: 97.9 °F (36.6 °C)   SpO2: 97%       Past Medical History:   has no past medical history on file.    Past Surgical History:   has no past surgical history on file.    Medications:   Scheduled Meds:    allopurinol  300 mg Oral Daily    sodium chloride flush  5-40 mL IntraVENous 2 times per day    [Held by provider] enoxaparin  40 mg SubCUTAneous Daily    hydroxyurea  1,000 mg Oral Daily     Continuous Infusions:    sodium chloride      sodium chloride       PRN Meds: HYDROcodone-acetaminophen, morphine, sodium chloride flush, sodium chloride, potassium chloride **OR** potassium alternative oral replacement **OR** potassium chloride, magnesium sulfate, ondansetron **OR** ondansetron, polyethylene glycol, acetaminophen **OR** [DISCONTINUED] acetaminophen  Home Meds:   Prior to Admission medications    Not on File     Coumadin Use Last 7 Days:  no  Antiplatelet drug therapy use last 7 days: no  Other anticoagulant use last 7 days: no     Pre-Sedation Documentation and Exam:   I have reviewed the patient's history and review of systems.    Mallampati Airway Assessment:  Mallampati Class II - (soft palate, fauces & uvula are visible)    Prior History of Anesthesia Complications:   none    ASA Classification:  Class 2 - A normal healthy patient with mild systemic disease    Sedation/ Anesthesia Plan:   intravenous sedation    Medications Planned:

## 2025-05-08 NOTE — OR NURSING
Recovery period complete. VSS. Dressing clean dry and intact. Jeni complete. Alert. Awaiting transport .

## 2025-05-08 NOTE — CONSULTS
Bone marrow biopsy completed.  
NEG mg/dL    Glucose, UA POC Negative NEG mg/dL    Ketones, Urine, POC Negative NEG mg/dL    Bilirubin, Urine, POC Negative NEG      Blood, UA POC Negative NEG      URINE UROBILINOGEN POC 0.2 0.2 - 1.0 EU/dL    Nitrite, Urine, POC Negative NEG      Leukocyte Est, UA POC Negative NEG      Performed by: Matilde Toure    Reticulocytes    Collection Time: 05/08/25 10:45 AM   Result Value Ref Range    Reticulocyte Count,Automated 2.9 (H) 0.3 - 2.0 %    Absolute Retic # [id number] 0.026 - 0.095 M/ul    Immature Retic Fraction 28.3 (H) 2.3 - 13.4 %    Retic Hemoglobin conc. 34 29 - 35 pg   Protime-INR    Collection Time: 05/08/25 10:45 AM   Result Value Ref Range    Protime 16.0 (H) 11.3 - 14.9 sec    INR 1.2     APTT    Collection Time: 05/08/25 10:45 AM   Result Value Ref Range    APTT 37.9 (H) 23.3 - 37.4 SEC   Fibrinogen    Collection Time: 05/08/25 10:45 AM   Result Value Ref Range    Fibrinogen 435 190 - 501 mg/dL   D-Dimer, Quantitative    Collection Time: 05/08/25 10:45 AM   Result Value Ref Range    D-Dimer, Quant 0.31 <0.56 ug/ml(FEU)   CBC with Auto Differential    Collection Time: 05/08/25 10:45 AM   Result Value Ref Range    .3 (HH) 4.3 - 11.1 K/uL    RBC 2.54 (L) 4.23 - 5.6 M/uL    Hemoglobin 8.8 (L) 13.6 - 17.2 g/dL    Hematocrit 25.4 (L) 41.1 - 50.3 %    .0 82 - 102 FL    MCH 34.6 (H) 26.1 - 32.9 PG    MCHC 34.6 31.4 - 35.0 g/dL    RDW 17.4 (H) 11.9 - 14.6 %    Platelets 1,070 (HH) 150 - 450 K/uL    MPV 9.1 (L) 9.4 - 12.3 FL    nRBC 1.03 (H) 0.0 - 0.2 K/uL    Differential Type PENDING    Iron and TIBC    Collection Time: 05/08/25 10:45 AM   Result Value Ref Range    Iron 75 35 - 100 ug/dL    TIBC 253 240 - 450 ug/dL    Iron % Saturation 30 20 - 50 %    UIBC 178.0 112.0 - 347.0 ug/dL   Comprehensive Metabolic Panel    Collection Time: 05/08/25 10:45 AM   Result Value Ref Range    Sodium 142 136 - 145 mmol/L    Potassium 3.5 3.5 - 5.1 mmol/L    Chloride 104 98 - 107 mmol/L    CO2 22 20 - 29

## 2025-05-08 NOTE — BRIEF OP NOTE
Brief Postoperative Note      Patient: Celestino Quesada  YOB: 1990  MRN: [id number]    Date of Procedure: 5/8/2025    Pre-Op Diagnosis: Suspected new onset leukemia    Post-Op Diagnosis: Same       : [place]    CT guided bone marrow aspirate and core bx    Anesthesia: Moderate sedation    Estimated Blood Loss (mL): Minimal    Complications: None    Specimens: As above    Implants:  * No implants in log *      Drains: * No LDAs found *    Findings:  As above    Electronically signed by Robe Villa MD on 5/8/2025 at 3:18 PM

## 2025-05-09 LAB
ALBUMIN SERPL-MCNC: 3.5 G/DL (ref 3.5–5)
ALBUMIN SERPL-MCNC: 4 G/DL (ref 3.5–5)
ALBUMIN/GLOB SERPL: 1.2 (ref 1–1.9)
ALBUMIN/GLOB SERPL: 1.2 (ref 1–1.9)
ALP SERPL-CCNC: 80 U/L (ref 40–129)
ALP SERPL-CCNC: 95 U/L (ref 40–129)
ALT SERPL-CCNC: 14 U/L (ref 8–55)
ALT SERPL-CCNC: 18 U/L (ref 8–55)
ANION GAP SERPL CALC-SCNC: 12 MMOL/L (ref 7–16)
ANION GAP SERPL CALC-SCNC: 13 MMOL/L (ref 7–16)
APTT PPP: 37.5 SEC (ref 23.3–37.4)
AST SERPL-CCNC: 25 U/L (ref 15–37)
AST SERPL-CCNC: 32 U/L (ref 15–37)
BASOPHILS # BLD: 15.69 K/UL (ref 0–0.2)
BASOPHILS # BLD: 22.44 K/UL (ref 0–0.2)
BASOPHILS NFR BLD MANUAL: 5 % (ref 0–2)
BASOPHILS NFR BLD MANUAL: 6 % (ref 0–2)
BILIRUB SERPL-MCNC: 0.3 MG/DL (ref 0–1.2)
BILIRUB SERPL-MCNC: 0.4 MG/DL (ref 0–1.2)
BLASTS NFR BLD MANUAL: 6 %
BLASTS NFR BLD MANUAL: 8 %
BUN SERPL-MCNC: 15 MG/DL (ref 6–23)
BUN SERPL-MCNC: 18 MG/DL (ref 6–23)
CALCIUM SERPL-MCNC: 9.2 MG/DL (ref 8.8–10.2)
CALCIUM SERPL-MCNC: 9.7 MG/DL (ref 8.8–10.2)
CHLORIDE SERPL-SCNC: 104 MMOL/L (ref 98–107)
CHLORIDE SERPL-SCNC: 107 MMOL/L (ref 98–107)
CO2 SERPL-SCNC: 24 MMOL/L (ref 20–29)
CO2 SERPL-SCNC: 25 MMOL/L (ref 20–29)
CREAT SERPL-MCNC: 0.96 MG/DL (ref 0.8–1.3)
CREAT SERPL-MCNC: 1.06 MG/DL (ref 0.8–1.3)
D DIMER PPP FEU-MCNC: <0.27 UG/ML(FEU)
DIFFERENTIAL METHOD BLD: ABNORMAL
DIFFERENTIAL METHOD BLD: ABNORMAL
EOSINOPHIL # BLD: 11.22 K/UL (ref 0–0.8)
EOSINOPHIL NFR BLD MANUAL: 3 % (ref 1–8)
ERYTHROCYTE [DISTWIDTH] IN BLOOD BY AUTOMATED COUNT: 17.4 % (ref 11.9–14.6)
ERYTHROCYTE [DISTWIDTH] IN BLOOD BY AUTOMATED COUNT: 17.6 % (ref 11.9–14.6)
FIBRINOGEN PPP-MCNC: 367 MG/DL (ref 190–501)
GLOBULIN SER CALC-MCNC: 3 G/DL (ref 2.3–3.5)
GLOBULIN SER CALC-MCNC: 3.4 G/DL (ref 2.3–3.5)
GLUCOSE SERPL-MCNC: 113 MG/DL (ref 70–99)
GLUCOSE SERPL-MCNC: 84 MG/DL (ref 70–99)
HAPTOGLOB SERPL-MCNC: 95 MG/DL (ref 30–200)
HCT VFR BLD AUTO: 23.1 % (ref 41.1–50.3)
HCT VFR BLD AUTO: 23.2 % (ref 41.1–50.3)
HGB BLD-MCNC: 7.7 G/DL (ref 13.6–17.2)
HGB BLD-MCNC: 8 G/DL (ref 13.6–17.2)
INR PPP: 1.1
LYMPHOCYTES # BLD: 7.48 K/UL (ref 0.5–4.6)
LYMPHOCYTES # BLD: 9.41 K/UL (ref 0.5–4.6)
LYMPHOCYTES NFR BLD MANUAL: 2 % (ref 16–44)
LYMPHOCYTES NFR BLD MANUAL: 3 % (ref 16–44)
MAGNESIUM SERPL-MCNC: 2.3 MG/DL (ref 1.8–2.4)
MCH RBC QN AUTO: 35 PG (ref 26.1–32.9)
MCH RBC QN AUTO: 35.2 PG (ref 26.1–32.9)
MCHC RBC AUTO-ENTMCNC: 33.3 G/DL (ref 31.4–35)
MCHC RBC AUTO-ENTMCNC: 34.5 G/DL (ref 31.4–35)
MCV RBC AUTO: 102.2 FL (ref 82–102)
MCV RBC AUTO: 105 FL (ref 82–102)
METAMYELOCYTES NFR BLD MANUAL: 10 %
METAMYELOCYTES NFR BLD MANUAL: 14 %
MONOCYTES # BLD: 3.74 K/UL (ref 0.1–1.3)
MONOCYTES # BLD: 6.27 K/UL (ref 0.1–1.3)
MONOCYTES NFR BLD MANUAL: 1 % (ref 3–9)
MONOCYTES NFR BLD MANUAL: 2 % (ref 3–9)
MYELOCYTES NFR BLD MANUAL: 14 %
MYELOCYTES NFR BLD MANUAL: 22 %
NEUTS BAND NFR BLD MANUAL: 23 % (ref 0–10)
NEUTS BAND NFR BLD MANUAL: 8 % (ref 0–10)
NEUTS SEG # BLD: 257.23 K/UL (ref 1.7–8.2)
NEUTS SEG # BLD: 306.68 K/UL (ref 1.7–8.2)
NEUTS SEG NFR BLD MANUAL: 17 % (ref 47–75)
NEUTS SEG NFR BLD MANUAL: 32 % (ref 47–75)
NRBC # BLD: 1.01 K/UL (ref 0–0.2)
NRBC # BLD: 1.76 K/UL (ref 0–0.2)
PHOSPHATE SERPL-MCNC: 4.2 MG/DL (ref 2.5–4.5)
PHOSPHATE SERPL-MCNC: 4.3 MG/DL (ref 2.5–4.5)
PLATELET # BLD AUTO: 903 K/UL (ref 150–450)
PLATELET # BLD AUTO: 996 K/UL (ref 150–450)
PLATELET COMMENT: ABNORMAL
PLATELET COMMENT: ABNORMAL
PMV BLD AUTO: 9.2 FL (ref 9.4–12.3)
PMV BLD AUTO: 9.2 FL (ref 9.4–12.3)
POTASSIUM SERPL-SCNC: 3.4 MMOL/L (ref 3.5–5.1)
POTASSIUM SERPL-SCNC: 3.7 MMOL/L (ref 3.5–5.1)
PROMYELOCYTES NFR BLD MANUAL: 18 %
PROMYELOCYTES NFR BLD MANUAL: 6 %
PROT SERPL-MCNC: 6.5 G/DL (ref 6.3–8.2)
PROT SERPL-MCNC: 7.3 G/DL (ref 6.3–8.2)
PROTHROMBIN TIME: 15.3 SEC (ref 11.3–14.9)
RBC # BLD AUTO: 2.2 M/UL (ref 4.23–5.6)
RBC # BLD AUTO: 2.27 M/UL (ref 4.23–5.6)
RBC MORPH BLD: ABNORMAL
SODIUM SERPL-SCNC: 141 MMOL/L (ref 136–145)
SODIUM SERPL-SCNC: 143 MMOL/L (ref 136–145)
URATE SERPL-MCNC: 5.3 MG/DL (ref 3.9–8.2)
URATE SERPL-MCNC: 6.3 MG/DL (ref 3.9–8.2)
WBC # BLD AUTO: 313.7 K/UL (ref 4.3–11.1)
WBC # BLD AUTO: 374 K/UL (ref 4.3–11.1)
WBC MORPH BLD: ABNORMAL
WBC MORPH BLD: ABNORMAL

## 2025-05-09 PROCEDURE — APPSS45 APP SPLIT SHARED TIME 31-45 MINUTES: Performed by: NURSE PRACTITIONER

## 2025-05-09 PROCEDURE — 2500000003 HC RX 250 WO HCPCS: Performed by: HOSPITALIST

## 2025-05-09 PROCEDURE — 36415 COLL VENOUS BLD VENIPUNCTURE: CPT

## 2025-05-09 PROCEDURE — 1170000000 HC RM PRIVATE ONCOLOGY

## 2025-05-09 PROCEDURE — 6370000000 HC RX 637 (ALT 250 FOR IP): Performed by: STUDENT IN AN ORGANIZED HEALTH CARE EDUCATION/TRAINING PROGRAM

## 2025-05-09 PROCEDURE — 99232 SBSQ HOSP IP/OBS MODERATE 35: CPT | Performed by: STUDENT IN AN ORGANIZED HEALTH CARE EDUCATION/TRAINING PROGRAM

## 2025-05-09 PROCEDURE — 85025 COMPLETE CBC W/AUTO DIFF WBC: CPT

## 2025-05-09 PROCEDURE — 6370000000 HC RX 637 (ALT 250 FOR IP): Performed by: NURSE PRACTITIONER

## 2025-05-09 PROCEDURE — 85384 FIBRINOGEN ACTIVITY: CPT

## 2025-05-09 PROCEDURE — 84100 ASSAY OF PHOSPHORUS: CPT

## 2025-05-09 PROCEDURE — 85610 PROTHROMBIN TIME: CPT

## 2025-05-09 PROCEDURE — 84550 ASSAY OF BLOOD/URIC ACID: CPT

## 2025-05-09 PROCEDURE — 85730 THROMBOPLASTIN TIME PARTIAL: CPT

## 2025-05-09 PROCEDURE — 83735 ASSAY OF MAGNESIUM: CPT

## 2025-05-09 PROCEDURE — 85379 FIBRIN DEGRADATION QUANT: CPT

## 2025-05-09 PROCEDURE — 80053 COMPREHEN METABOLIC PANEL: CPT

## 2025-05-09 RX ORDER — POTASSIUM CHLORIDE 1500 MG/1
20 TABLET, EXTENDED RELEASE ORAL 2 TIMES DAILY
Status: DISCONTINUED | OUTPATIENT
Start: 2025-05-09 | End: 2025-05-12

## 2025-05-09 RX ORDER — HYDROXYUREA 500 MG/1
1000 CAPSULE ORAL DAILY
Status: DISCONTINUED | OUTPATIENT
Start: 2025-05-10 | End: 2025-05-10

## 2025-05-09 RX ORDER — HYDROXYUREA 500 MG/1
500 CAPSULE ORAL NIGHTLY
Status: DISCONTINUED | OUTPATIENT
Start: 2025-05-10 | End: 2025-05-09

## 2025-05-09 RX ORDER — HYDROXYUREA 500 MG/1
1000 CAPSULE ORAL DAILY
Status: DISCONTINUED | OUTPATIENT
Start: 2025-05-10 | End: 2025-05-09

## 2025-05-09 RX ORDER — HYDROXYUREA 500 MG/1
500 CAPSULE ORAL NIGHTLY
Status: DISCONTINUED | OUTPATIENT
Start: 2025-05-09 | End: 2025-05-10

## 2025-05-09 RX ADMIN — SODIUM CHLORIDE, PRESERVATIVE FREE 10 ML: 5 INJECTION INTRAVENOUS at 20:42

## 2025-05-09 RX ADMIN — SODIUM CHLORIDE, PRESERVATIVE FREE 10 ML: 5 INJECTION INTRAVENOUS at 08:00

## 2025-05-09 RX ADMIN — HYDROXYUREA 1000 MG: 500 CAPSULE ORAL at 07:42

## 2025-05-09 RX ADMIN — POTASSIUM CHLORIDE 20 MEQ: 1500 TABLET, EXTENDED RELEASE ORAL at 20:41

## 2025-05-09 RX ADMIN — ALLOPURINOL 300 MG: 300 TABLET ORAL at 07:42

## 2025-05-09 ASSESSMENT — PAIN SCALES - GENERAL
PAINLEVEL_OUTOF10: 0

## 2025-05-09 NOTE — CARE COORDINATION
Chart reviewed.    BMBx 5/8 path pending.  Continues to c/o AVELAR, on RA.    PT/OT not ordered at this time.    Pt plans on discharging home with his family when medically ready.  Will continue to follow.

## 2025-05-10 LAB
ALBUMIN SERPL-MCNC: 3.6 G/DL (ref 3.5–5)
ALBUMIN SERPL-MCNC: 4 G/DL (ref 3.5–5)
ALBUMIN/GLOB SERPL: 1.1 (ref 1–1.9)
ALBUMIN/GLOB SERPL: 1.2 (ref 1–1.9)
ALP SERPL-CCNC: 79 U/L (ref 40–129)
ALP SERPL-CCNC: 93 U/L (ref 40–129)
ALT SERPL-CCNC: 14 U/L (ref 8–55)
ALT SERPL-CCNC: 17 U/L (ref 8–55)
ANION GAP SERPL CALC-SCNC: 12 MMOL/L (ref 7–16)
ANION GAP SERPL CALC-SCNC: 13 MMOL/L (ref 7–16)
APTT PPP: 38.3 SEC (ref 23.3–37.4)
AST SERPL-CCNC: 24 U/L (ref 15–37)
AST SERPL-CCNC: 35 U/L (ref 15–37)
BASOPHILS # BLD: 19.99 K/UL (ref 0–0.2)
BASOPHILS # BLD: 38.45 K/UL (ref 0–0.2)
BASOPHILS NFR BLD MANUAL: 11 % (ref 0–2)
BASOPHILS NFR BLD MANUAL: 5 % (ref 0–2)
BILIRUB SERPL-MCNC: 0.3 MG/DL (ref 0–1.2)
BILIRUB SERPL-MCNC: 0.4 MG/DL (ref 0–1.2)
BLASTS NFR BLD MANUAL: 6 %
BLASTS NFR BLD MANUAL: 6 %
BUN SERPL-MCNC: 16 MG/DL (ref 6–23)
BUN SERPL-MCNC: 16 MG/DL (ref 6–23)
CALCIUM SERPL-MCNC: 9.3 MG/DL (ref 8.8–10.2)
CALCIUM SERPL-MCNC: 9.7 MG/DL (ref 8.8–10.2)
CHLORIDE SERPL-SCNC: 102 MMOL/L (ref 98–107)
CHLORIDE SERPL-SCNC: 106 MMOL/L (ref 98–107)
CO2 SERPL-SCNC: 23 MMOL/L (ref 20–29)
CO2 SERPL-SCNC: 24 MMOL/L (ref 20–29)
CREAT SERPL-MCNC: 0.98 MG/DL (ref 0.8–1.3)
CREAT SERPL-MCNC: 1.01 MG/DL (ref 0.8–1.3)
D DIMER PPP FEU-MCNC: <0.27 UG/ML(FEU)
DIFFERENTIAL METHOD BLD: ABNORMAL
DIFFERENTIAL METHOD BLD: ABNORMAL
EOSINOPHIL # BLD: 11.99 K/UL (ref 0–0.8)
EOSINOPHIL # BLD: 6.99 K/UL (ref 0–0.8)
EOSINOPHIL NFR BLD MANUAL: 2 % (ref 1–8)
EOSINOPHIL NFR BLD MANUAL: 3 % (ref 1–8)
ERYTHROCYTE [DISTWIDTH] IN BLOOD BY AUTOMATED COUNT: 17.4 % (ref 11.9–14.6)
ERYTHROCYTE [DISTWIDTH] IN BLOOD BY AUTOMATED COUNT: 17.4 % (ref 11.9–14.6)
FIBRINOGEN PPP-MCNC: 389 MG/DL (ref 190–501)
GLOBULIN SER CALC-MCNC: 3 G/DL (ref 2.3–3.5)
GLOBULIN SER CALC-MCNC: 3.5 G/DL (ref 2.3–3.5)
GLUCOSE SERPL-MCNC: 95 MG/DL (ref 70–99)
GLUCOSE SERPL-MCNC: 97 MG/DL (ref 70–99)
HCT VFR BLD AUTO: 22.6 % (ref 41.1–50.3)
HCT VFR BLD AUTO: 23.4 % (ref 41.1–50.3)
HGB BLD-MCNC: 7.9 G/DL (ref 13.6–17.2)
HGB BLD-MCNC: 8 G/DL (ref 13.6–17.2)
INR PPP: 1.3
LYMPHOCYTES # BLD: 17.48 K/UL (ref 0.5–4.6)
LYMPHOCYTES # BLD: 4 K/UL (ref 0.5–4.6)
LYMPHOCYTES NFR BLD MANUAL: 1 % (ref 16–44)
LYMPHOCYTES NFR BLD MANUAL: 5 % (ref 16–44)
MAGNESIUM SERPL-MCNC: 2.2 MG/DL (ref 1.8–2.4)
MCH RBC QN AUTO: 34.6 PG (ref 26.1–32.9)
MCH RBC QN AUTO: 35.2 PG (ref 26.1–32.9)
MCHC RBC AUTO-ENTMCNC: 33.8 G/DL (ref 31.4–35)
MCHC RBC AUTO-ENTMCNC: 35.4 G/DL (ref 31.4–35)
MCV RBC AUTO: 102.6 FL (ref 82–102)
MCV RBC AUTO: 99.6 FL (ref 82–102)
METAMYELOCYTES NFR BLD MANUAL: 4 %
METAMYELOCYTES NFR BLD MANUAL: 7 %
MONOCYTES # BLD: 17.48 K/UL (ref 0.1–1.3)
MONOCYTES # BLD: 4 K/UL (ref 0.1–1.3)
MONOCYTES NFR BLD MANUAL: 1 % (ref 3–9)
MONOCYTES NFR BLD MANUAL: 5 % (ref 3–9)
MYELOCYTES NFR BLD MANUAL: 14 %
MYELOCYTES NFR BLD MANUAL: 9 %
NEUTS BAND NFR BLD MANUAL: 20 % (ref 0–10)
NEUTS BAND NFR BLD MANUAL: 9 % (ref 0–10)
NEUTS SEG # BLD: 248.15 K/UL (ref 1.7–8.2)
NEUTS SEG # BLD: 335.75 K/UL (ref 1.7–8.2)
NEUTS SEG NFR BLD MANUAL: 28 % (ref 47–75)
NEUTS SEG NFR BLD MANUAL: 35 % (ref 47–75)
NRBC # BLD: 1.35 K/UL (ref 0–0.2)
NRBC # BLD: 2.28 K/UL (ref 0–0.2)
PHOSPHATE SERPL-MCNC: 4.7 MG/DL (ref 2.5–4.5)
PHOSPHATE SERPL-MCNC: 5.1 MG/DL (ref 2.5–4.5)
PLATELET # BLD AUTO: 1055 K/UL (ref 150–450)
PLATELET # BLD AUTO: 938 K/UL (ref 150–450)
PLATELET COMMENT: ABNORMAL
PLATELET COMMENT: ABNORMAL
PMV BLD AUTO: 9 FL (ref 9.4–12.3)
PMV BLD AUTO: 9.1 FL (ref 9.4–12.3)
POTASSIUM SERPL-SCNC: 3.8 MMOL/L (ref 3.5–5.1)
POTASSIUM SERPL-SCNC: 4.2 MMOL/L (ref 3.5–5.1)
PROMYELOCYTES NFR BLD MANUAL: 14 %
PROMYELOCYTES NFR BLD MANUAL: 15 %
PROT SERPL-MCNC: 6.6 G/DL (ref 6.3–8.2)
PROT SERPL-MCNC: 7.5 G/DL (ref 6.3–8.2)
PROTHROMBIN TIME: 16.1 SEC (ref 11.3–14.9)
RBC # BLD AUTO: 2.27 M/UL (ref 4.23–5.6)
RBC # BLD AUTO: 2.28 M/UL (ref 4.23–5.6)
RBC MORPH BLD: ABNORMAL
SODIUM SERPL-SCNC: 137 MMOL/L (ref 136–145)
SODIUM SERPL-SCNC: 142 MMOL/L (ref 136–145)
URATE SERPL-MCNC: 5.1 MG/DL (ref 3.9–8.2)
URATE SERPL-MCNC: 5.5 MG/DL (ref 3.9–8.2)
WBC # BLD AUTO: 349.5 K/UL (ref 4.3–11.1)
WBC # BLD AUTO: 399.7 K/UL (ref 4.3–11.1)
WBC MORPH BLD: ABNORMAL
WBC MORPH BLD: ABNORMAL

## 2025-05-10 PROCEDURE — 36415 COLL VENOUS BLD VENIPUNCTURE: CPT

## 2025-05-10 PROCEDURE — 6370000000 HC RX 637 (ALT 250 FOR IP): Performed by: STUDENT IN AN ORGANIZED HEALTH CARE EDUCATION/TRAINING PROGRAM

## 2025-05-10 PROCEDURE — 84100 ASSAY OF PHOSPHORUS: CPT

## 2025-05-10 PROCEDURE — 85384 FIBRINOGEN ACTIVITY: CPT

## 2025-05-10 PROCEDURE — APPSS30 APP SPLIT SHARED TIME 16-30 MINUTES: Performed by: NURSE PRACTITIONER

## 2025-05-10 PROCEDURE — 2500000003 HC RX 250 WO HCPCS: Performed by: HOSPITALIST

## 2025-05-10 PROCEDURE — 85610 PROTHROMBIN TIME: CPT

## 2025-05-10 PROCEDURE — 85025 COMPLETE CBC W/AUTO DIFF WBC: CPT

## 2025-05-10 PROCEDURE — 99232 SBSQ HOSP IP/OBS MODERATE 35: CPT | Performed by: STUDENT IN AN ORGANIZED HEALTH CARE EDUCATION/TRAINING PROGRAM

## 2025-05-10 PROCEDURE — 6370000000 HC RX 637 (ALT 250 FOR IP): Performed by: NURSE PRACTITIONER

## 2025-05-10 PROCEDURE — 1170000000 HC RM PRIVATE ONCOLOGY

## 2025-05-10 PROCEDURE — 85730 THROMBOPLASTIN TIME PARTIAL: CPT

## 2025-05-10 PROCEDURE — 85379 FIBRIN DEGRADATION QUANT: CPT

## 2025-05-10 PROCEDURE — 83735 ASSAY OF MAGNESIUM: CPT

## 2025-05-10 PROCEDURE — 84550 ASSAY OF BLOOD/URIC ACID: CPT

## 2025-05-10 PROCEDURE — 80053 COMPREHEN METABOLIC PANEL: CPT

## 2025-05-10 RX ORDER — HYDROXYUREA 500 MG/1
1000 CAPSULE ORAL 2 TIMES DAILY
Status: DISCONTINUED | OUTPATIENT
Start: 2025-05-10 | End: 2025-05-12

## 2025-05-10 RX ADMIN — HYDROXYUREA 1000 MG: 500 CAPSULE ORAL at 20:07

## 2025-05-10 RX ADMIN — POTASSIUM CHLORIDE 20 MEQ: 1500 TABLET, EXTENDED RELEASE ORAL at 07:50

## 2025-05-10 RX ADMIN — SODIUM CHLORIDE, PRESERVATIVE FREE 10 ML: 5 INJECTION INTRAVENOUS at 20:08

## 2025-05-10 RX ADMIN — HYDROXYUREA 1000 MG: 500 CAPSULE ORAL at 07:50

## 2025-05-10 RX ADMIN — ALLOPURINOL 300 MG: 300 TABLET ORAL at 07:50

## 2025-05-10 RX ADMIN — SODIUM CHLORIDE, PRESERVATIVE FREE 10 ML: 5 INJECTION INTRAVENOUS at 07:50

## 2025-05-10 RX ADMIN — POTASSIUM CHLORIDE 20 MEQ: 1500 TABLET, EXTENDED RELEASE ORAL at 20:07

## 2025-05-10 ASSESSMENT — PAIN SCALES - GENERAL
PAINLEVEL_OUTOF10: 0
PAINLEVEL_OUTOF10: 0

## 2025-05-10 NOTE — PLAN OF CARE
Problem: Discharge Planning  Goal: Discharge to home or other facility with appropriate resources  5/10/2025 0717 by Emelina Haines, RN  Outcome: Progressing  5/9/2025 2137 by Shelly Nice RN  Outcome: Progressing  Flowsheets (Taken 5/9/2025 2000)  Discharge to home or other facility with appropriate resources: Identify barriers to discharge with patient and caregiver     Problem: Pain  Goal: Verbalizes/displays adequate comfort level or baseline comfort level  5/10/2025 0717 by Emelina Haines RN  Outcome: Progressing  Flowsheets (Taken 5/10/2025 0330 by Shelly Nice, RN)  Verbalizes/displays adequate comfort level or baseline comfort level:   Encourage patient to monitor pain and request assistance   Assess pain using appropriate pain scale   Administer analgesics based on type and severity of pain and evaluate response   Implement non-pharmacological measures as appropriate and evaluate response  5/9/2025 2137 by Shelly Nice, RN  Outcome: Progressing

## 2025-05-11 LAB
ALBUMIN SERPL-MCNC: 3.6 G/DL (ref 3.5–5)
ALBUMIN SERPL-MCNC: 3.9 G/DL (ref 3.5–5)
ALBUMIN/GLOB SERPL: 1.1 (ref 1–1.9)
ALBUMIN/GLOB SERPL: 1.2 (ref 1–1.9)
ALP SERPL-CCNC: 79 U/L (ref 40–129)
ALP SERPL-CCNC: 87 U/L (ref 40–129)
ALT SERPL-CCNC: 15 U/L (ref 8–55)
ALT SERPL-CCNC: 19 U/L (ref 8–55)
ANION GAP SERPL CALC-SCNC: 11 MMOL/L (ref 7–16)
ANION GAP SERPL CALC-SCNC: 13 MMOL/L (ref 7–16)
APTT PPP: 37.1 SEC (ref 23.3–37.4)
AST SERPL-CCNC: 29 U/L (ref 15–37)
AST SERPL-CCNC: 34 U/L (ref 15–37)
BASOPHILS # BLD: 28.94 K/UL (ref 0–0.2)
BASOPHILS # BLD: 38.87 K/UL (ref 0–0.2)
BASOPHILS NFR BLD MANUAL: 11 % (ref 0–2)
BASOPHILS NFR BLD MANUAL: 9 % (ref 0–2)
BILIRUB SERPL-MCNC: 0.4 MG/DL (ref 0–1.2)
BILIRUB SERPL-MCNC: 0.4 MG/DL (ref 0–1.2)
BLASTS NFR BLD MANUAL: 5 %
BLASTS NFR BLD MANUAL: 8 %
BUN SERPL-MCNC: 16 MG/DL (ref 6–23)
BUN SERPL-MCNC: 19 MG/DL (ref 6–23)
CALCIUM SERPL-MCNC: 9.5 MG/DL (ref 8.8–10.2)
CALCIUM SERPL-MCNC: 9.8 MG/DL (ref 8.8–10.2)
CHLORIDE SERPL-SCNC: 102 MMOL/L (ref 98–107)
CHLORIDE SERPL-SCNC: 105 MMOL/L (ref 98–107)
CO2 SERPL-SCNC: 25 MMOL/L (ref 20–29)
CO2 SERPL-SCNC: 26 MMOL/L (ref 20–29)
CREAT SERPL-MCNC: 0.91 MG/DL (ref 0.8–1.3)
CREAT SERPL-MCNC: 0.92 MG/DL (ref 0.8–1.3)
D DIMER PPP FEU-MCNC: 0.37 UG/ML(FEU)
DIFFERENTIAL METHOD BLD: ABNORMAL
DIFFERENTIAL METHOD BLD: ABNORMAL
EOSINOPHIL # BLD: 6.43 K/UL (ref 0–0.8)
EOSINOPHIL # BLD: 7.07 K/UL (ref 0–0.8)
EOSINOPHIL NFR BLD MANUAL: 2 % (ref 1–8)
EOSINOPHIL NFR BLD MANUAL: 2 % (ref 1–8)
ERYTHROCYTE [DISTWIDTH] IN BLOOD BY AUTOMATED COUNT: 17.3 % (ref 11.9–14.6)
ERYTHROCYTE [DISTWIDTH] IN BLOOD BY AUTOMATED COUNT: 17.4 % (ref 11.9–14.6)
FIBRINOGEN PPP-MCNC: 402 MG/DL (ref 190–501)
GLOBULIN SER CALC-MCNC: 3.2 G/DL (ref 2.3–3.5)
GLOBULIN SER CALC-MCNC: 3.3 G/DL (ref 2.3–3.5)
GLUCOSE SERPL-MCNC: 90 MG/DL (ref 70–99)
GLUCOSE SERPL-MCNC: 90 MG/DL (ref 70–99)
HCT VFR BLD AUTO: 22.8 % (ref 41.1–50.3)
HCT VFR BLD AUTO: 23.1 % (ref 41.1–50.3)
HGB BLD-MCNC: 7.8 G/DL (ref 13.6–17.2)
HGB BLD-MCNC: 8 G/DL (ref 13.6–17.2)
INR PPP: 1.2
LYMPHOCYTES # BLD: 3.22 K/UL (ref 0.5–4.6)
LYMPHOCYTES # BLD: 7.07 K/UL (ref 0.5–4.6)
LYMPHOCYTES NFR BLD MANUAL: 1 % (ref 16–44)
LYMPHOCYTES NFR BLD MANUAL: 2 % (ref 16–44)
MAGNESIUM SERPL-MCNC: 2.3 MG/DL (ref 1.8–2.4)
MCH RBC QN AUTO: 34.5 PG (ref 26.1–32.9)
MCH RBC QN AUTO: 35.4 PG (ref 26.1–32.9)
MCHC RBC AUTO-ENTMCNC: 33.8 G/DL (ref 31.4–35)
MCHC RBC AUTO-ENTMCNC: 35.1 G/DL (ref 31.4–35)
MCV RBC AUTO: 100.9 FL (ref 82–102)
MCV RBC AUTO: 102.2 FL (ref 82–102)
METAMYELOCYTES NFR BLD MANUAL: 13 %
METAMYELOCYTES NFR BLD MANUAL: 5 %
MONOCYTES # BLD: 16.08 K/UL (ref 0.1–1.3)
MONOCYTES # BLD: 7.07 K/UL (ref 0.1–1.3)
MONOCYTES NFR BLD MANUAL: 2 % (ref 3–9)
MONOCYTES NFR BLD MANUAL: 5 % (ref 3–9)
MYELOCYTES NFR BLD MANUAL: 2 %
MYELOCYTES NFR BLD MANUAL: 8 %
NEUTS BAND NFR BLD MANUAL: 15 % (ref 0–10)
NEUTS BAND NFR BLD MANUAL: 17 % (ref 0–10)
NEUTS SEG # BLD: 241.13 K/UL (ref 1.7–8.2)
NEUTS SEG # BLD: 275.65 K/UL (ref 1.7–8.2)
NEUTS SEG NFR BLD MANUAL: 24 % (ref 47–75)
NEUTS SEG NFR BLD MANUAL: 31 % (ref 47–75)
NRBC # BLD: 1.61 K/UL (ref 0–0.2)
NRBC # BLD: 2 K/UL (ref 0–0.2)
PHOSPHATE SERPL-MCNC: 4.8 MG/DL (ref 2.5–4.5)
PHOSPHATE SERPL-MCNC: 5 MG/DL (ref 2.5–4.5)
PLATELET # BLD AUTO: 1001 K/UL (ref 150–450)
PLATELET # BLD AUTO: 1050 K/UL (ref 150–450)
PLATELET COMMENT: ABNORMAL
PLATELET COMMENT: ABNORMAL
PMV BLD AUTO: 9.1 FL (ref 9.4–12.3)
PMV BLD AUTO: 9.4 FL (ref 9.4–12.3)
POTASSIUM SERPL-SCNC: 3.6 MMOL/L (ref 3.5–5.1)
POTASSIUM SERPL-SCNC: 3.9 MMOL/L (ref 3.5–5.1)
PROMYELOCYTES NFR BLD MANUAL: 16 %
PROMYELOCYTES NFR BLD MANUAL: 22 %
PROT SERPL-MCNC: 6.8 G/DL (ref 6.3–8.2)
PROT SERPL-MCNC: 7.1 G/DL (ref 6.3–8.2)
PROTHROMBIN TIME: 15.6 SEC (ref 11.3–14.9)
RBC # BLD AUTO: 2.26 M/UL (ref 4.23–5.6)
RBC # BLD AUTO: 2.26 M/UL (ref 4.23–5.6)
RBC MORPH BLD: ABNORMAL
SODIUM SERPL-SCNC: 140 MMOL/L (ref 136–145)
SODIUM SERPL-SCNC: 141 MMOL/L (ref 136–145)
URATE SERPL-MCNC: 5.2 MG/DL (ref 3.9–8.2)
URATE SERPL-MCNC: 6.2 MG/DL (ref 3.9–8.2)
WBC # BLD AUTO: 321.5 K/UL (ref 4.3–11.1)
WBC # BLD AUTO: 353.4 K/UL (ref 4.3–11.1)
WBC MORPH BLD: ABNORMAL
WBC MORPH BLD: ABNORMAL

## 2025-05-11 PROCEDURE — APPSS30 APP SPLIT SHARED TIME 16-30 MINUTES: Performed by: NURSE PRACTITIONER

## 2025-05-11 PROCEDURE — 6370000000 HC RX 637 (ALT 250 FOR IP): Performed by: STUDENT IN AN ORGANIZED HEALTH CARE EDUCATION/TRAINING PROGRAM

## 2025-05-11 PROCEDURE — 1170000000 HC RM PRIVATE ONCOLOGY

## 2025-05-11 PROCEDURE — 99231 SBSQ HOSP IP/OBS SF/LOW 25: CPT | Performed by: STUDENT IN AN ORGANIZED HEALTH CARE EDUCATION/TRAINING PROGRAM

## 2025-05-11 PROCEDURE — 85379 FIBRIN DEGRADATION QUANT: CPT

## 2025-05-11 PROCEDURE — 80053 COMPREHEN METABOLIC PANEL: CPT

## 2025-05-11 PROCEDURE — 84550 ASSAY OF BLOOD/URIC ACID: CPT

## 2025-05-11 PROCEDURE — 85730 THROMBOPLASTIN TIME PARTIAL: CPT

## 2025-05-11 PROCEDURE — 6370000000 HC RX 637 (ALT 250 FOR IP): Performed by: NURSE PRACTITIONER

## 2025-05-11 PROCEDURE — 2500000003 HC RX 250 WO HCPCS: Performed by: HOSPITALIST

## 2025-05-11 PROCEDURE — 85384 FIBRINOGEN ACTIVITY: CPT

## 2025-05-11 PROCEDURE — 84100 ASSAY OF PHOSPHORUS: CPT

## 2025-05-11 PROCEDURE — 36415 COLL VENOUS BLD VENIPUNCTURE: CPT

## 2025-05-11 PROCEDURE — 85610 PROTHROMBIN TIME: CPT

## 2025-05-11 PROCEDURE — 85025 COMPLETE CBC W/AUTO DIFF WBC: CPT

## 2025-05-11 PROCEDURE — 83735 ASSAY OF MAGNESIUM: CPT

## 2025-05-11 RX ADMIN — SODIUM CHLORIDE, PRESERVATIVE FREE 10 ML: 5 INJECTION INTRAVENOUS at 20:09

## 2025-05-11 RX ADMIN — HYDROXYUREA 1000 MG: 500 CAPSULE ORAL at 08:09

## 2025-05-11 RX ADMIN — SODIUM CHLORIDE, PRESERVATIVE FREE 10 ML: 5 INJECTION INTRAVENOUS at 08:09

## 2025-05-11 RX ADMIN — POTASSIUM CHLORIDE 20 MEQ: 1500 TABLET, EXTENDED RELEASE ORAL at 08:08

## 2025-05-11 RX ADMIN — ALLOPURINOL 300 MG: 300 TABLET ORAL at 08:08

## 2025-05-11 RX ADMIN — HYDROXYUREA 1000 MG: 500 CAPSULE ORAL at 20:08

## 2025-05-11 RX ADMIN — POTASSIUM CHLORIDE 20 MEQ: 1500 TABLET, EXTENDED RELEASE ORAL at 20:09

## 2025-05-11 ASSESSMENT — PAIN SCALES - GENERAL
PAINLEVEL_OUTOF10: 0
PAINLEVEL_OUTOF10: 0

## 2025-05-11 NOTE — PLAN OF CARE
Problem: Discharge Planning  Goal: Discharge to home or other facility with appropriate resources  5/11/2025 0716 by Emelina Haines RN  Outcome: Progressing  5/10/2025 2054 by Shelly Nice RN  Outcome: Progressing  Flowsheets (Taken 5/10/2025 2000)  Discharge to home or other facility with appropriate resources: Identify barriers to discharge with patient and caregiver     Problem: Pain  Goal: Verbalizes/displays adequate comfort level or baseline comfort level  5/11/2025 0716 by Emelina Haines RN  Outcome: Progressing  5/10/2025 2054 by Shelly Nice RN  Outcome: Progressing  Flowsheets (Taken 5/10/2025 2000)  Verbalizes/displays adequate comfort level or baseline comfort level:   Encourage patient to monitor pain and request assistance   Assess pain using appropriate pain scale   Administer analgesics based on type and severity of pain and evaluate response   Implement non-pharmacological measures as appropriate and evaluate response

## 2025-05-12 VITALS
RESPIRATION RATE: 20 BRPM | HEIGHT: 70 IN | SYSTOLIC BLOOD PRESSURE: 142 MMHG | DIASTOLIC BLOOD PRESSURE: 72 MMHG | TEMPERATURE: 98.8 F | WEIGHT: 182.1 LBS | BODY MASS INDEX: 26.07 KG/M2 | OXYGEN SATURATION: 94 % | HEART RATE: 89 BPM

## 2025-05-12 LAB
ALBUMIN SERPL-MCNC: 3.6 G/DL (ref 3.5–5)
ALBUMIN/GLOB SERPL: 1.1 (ref 1–1.9)
ALP SERPL-CCNC: 83 U/L (ref 40–129)
ALT SERPL-CCNC: 16 U/L (ref 8–55)
ANION GAP SERPL CALC-SCNC: 10 MMOL/L (ref 7–16)
AST SERPL-CCNC: 27 U/L (ref 15–37)
BASOPHILS # BLD: 26.51 K/UL (ref 0–0.2)
BASOPHILS NFR BLD MANUAL: 9 % (ref 0–2)
BILIRUB SERPL-MCNC: 0.3 MG/DL (ref 0–1.2)
BLASTS NFR BLD MANUAL: 5 %
BUN SERPL-MCNC: 17 MG/DL (ref 6–23)
CALCIUM SERPL-MCNC: 9.8 MG/DL (ref 8.8–10.2)
CHLORIDE SERPL-SCNC: 105 MMOL/L (ref 98–107)
CO2 SERPL-SCNC: 25 MMOL/L (ref 20–29)
CREAT SERPL-MCNC: 0.93 MG/DL (ref 0.8–1.3)
DIFFERENTIAL METHOD BLD: ABNORMAL
EOSINOPHIL # BLD: 8.84 K/UL (ref 0–0.8)
EOSINOPHIL NFR BLD MANUAL: 3 % (ref 1–8)
ERYTHROCYTE [DISTWIDTH] IN BLOOD BY AUTOMATED COUNT: 17.3 % (ref 11.9–14.6)
FLOW CYTOMETRY RESULTS: NORMAL
GLOBULIN SER CALC-MCNC: 3.2 G/DL (ref 2.3–3.5)
GLUCOSE SERPL-MCNC: 95 MG/DL (ref 70–99)
HCT VFR BLD AUTO: 22.1 % (ref 41.1–50.3)
HGB BLD-MCNC: 7.6 G/DL (ref 13.6–17.2)
LYMPHOCYTES # BLD: 17.67 K/UL (ref 0.5–4.6)
LYMPHOCYTES NFR BLD MANUAL: 6 % (ref 16–44)
MAGNESIUM SERPL-MCNC: 2.3 MG/DL (ref 1.8–2.4)
MCH RBC QN AUTO: 35.5 PG (ref 26.1–32.9)
MCHC RBC AUTO-ENTMCNC: 34.4 G/DL (ref 31.4–35)
MCV RBC AUTO: 103.3 FL (ref 82–102)
METAMYELOCYTES NFR BLD MANUAL: 8 %
MONOCYTES # BLD: 11.78 K/UL (ref 0.1–1.3)
MONOCYTES NFR BLD MANUAL: 4 % (ref 3–9)
MYELOCYTES NFR BLD MANUAL: 16 %
NEUTS BAND NFR BLD MANUAL: 8 % (ref 0–10)
NEUTS SEG # BLD: 214.99 K/UL (ref 1.7–8.2)
NEUTS SEG NFR BLD MANUAL: 41 % (ref 47–75)
NRBC # BLD: 1.4 K/UL (ref 0–0.2)
PHOSPHATE SERPL-MCNC: 4.7 MG/DL (ref 2.5–4.5)
PLATELET # BLD AUTO: 937 K/UL (ref 150–450)
PLATELET COMMENT: ABNORMAL
PMV BLD AUTO: 9.2 FL (ref 9.4–12.3)
POTASSIUM SERPL-SCNC: 3.9 MMOL/L (ref 3.5–5.1)
PROT SERPL-MCNC: 6.8 G/DL (ref 6.3–8.2)
RBC # BLD AUTO: 2.14 M/UL (ref 4.23–5.6)
RBC MORPH BLD: ABNORMAL
SODIUM SERPL-SCNC: 140 MMOL/L (ref 136–145)
SPECIMEN SOURCE: NORMAL
TEST ORDERED: NORMAL
URATE SERPL-MCNC: 5.8 MG/DL (ref 3.9–8.2)
WBC # BLD AUTO: 294.5 K/UL (ref 4.3–11.1)
WBC MORPH BLD: ABNORMAL

## 2025-05-12 PROCEDURE — 83735 ASSAY OF MAGNESIUM: CPT

## 2025-05-12 PROCEDURE — 2500000003 HC RX 250 WO HCPCS: Performed by: HOSPITALIST

## 2025-05-12 PROCEDURE — 99239 HOSP IP/OBS DSCHRG MGMT >30: CPT | Performed by: STUDENT IN AN ORGANIZED HEALTH CARE EDUCATION/TRAINING PROGRAM

## 2025-05-12 PROCEDURE — 6370000000 HC RX 637 (ALT 250 FOR IP): Performed by: STUDENT IN AN ORGANIZED HEALTH CARE EDUCATION/TRAINING PROGRAM

## 2025-05-12 PROCEDURE — 80053 COMPREHEN METABOLIC PANEL: CPT

## 2025-05-12 PROCEDURE — APPSS60 APP SPLIT SHARED TIME 46-60 MINUTES: Performed by: NURSE PRACTITIONER

## 2025-05-12 PROCEDURE — 6370000000 HC RX 637 (ALT 250 FOR IP): Performed by: NURSE PRACTITIONER

## 2025-05-12 PROCEDURE — 36415 COLL VENOUS BLD VENIPUNCTURE: CPT

## 2025-05-12 PROCEDURE — 85025 COMPLETE CBC W/AUTO DIFF WBC: CPT

## 2025-05-12 PROCEDURE — 84100 ASSAY OF PHOSPHORUS: CPT

## 2025-05-12 PROCEDURE — 84550 ASSAY OF BLOOD/URIC ACID: CPT

## 2025-05-12 PROCEDURE — APPNB15 APP NON BILLABLE TIME 0-15 MINS: Performed by: NURSE PRACTITIONER

## 2025-05-12 RX ORDER — HYDROXYUREA 500 MG/1
1500 CAPSULE ORAL 2 TIMES DAILY
Status: DISCONTINUED | OUTPATIENT
Start: 2025-05-12 | End: 2025-05-12 | Stop reason: HOSPADM

## 2025-05-12 RX ORDER — ALLOPURINOL 300 MG/1
300 TABLET ORAL DAILY
Qty: 30 TABLET | Refills: 0 | Status: SHIPPED | OUTPATIENT
Start: 2025-05-13 | End: 2025-05-12 | Stop reason: HOSPADM

## 2025-05-12 RX ORDER — HYDROXYUREA 500 MG/1
500 CAPSULE ORAL
Status: COMPLETED | OUTPATIENT
Start: 2025-05-12 | End: 2025-05-12

## 2025-05-12 RX ORDER — HYDROXYUREA 500 MG/1
1000 CAPSULE ORAL 2 TIMES DAILY
Qty: 120 CAPSULE | Refills: 0 | Status: SHIPPED | OUTPATIENT
Start: 2025-05-12 | End: 2025-05-12 | Stop reason: HOSPADM

## 2025-05-12 RX ADMIN — HYDROXYUREA 500 MG: 500 CAPSULE ORAL at 10:49

## 2025-05-12 RX ADMIN — SODIUM CHLORIDE, PRESERVATIVE FREE 10 ML: 5 INJECTION INTRAVENOUS at 07:41

## 2025-05-12 RX ADMIN — POTASSIUM CHLORIDE 20 MEQ: 1500 TABLET, EXTENDED RELEASE ORAL at 07:38

## 2025-05-12 RX ADMIN — ALLOPURINOL 300 MG: 300 TABLET ORAL at 07:39

## 2025-05-12 RX ADMIN — HYDROXYUREA 1000 MG: 500 CAPSULE ORAL at 07:39

## 2025-05-12 ASSESSMENT — PAIN SCALES - GENERAL: PAINLEVEL_OUTOF10: 0

## 2025-05-12 NOTE — CASE COMMUNICATION
Pt requests transfer to [facility].  Referral line called.  No beds currently available at [facility].  Referral center is paging [facility]'s oncology service.      LIBORIO Perdomo - CNP  [facility]

## 2025-05-12 NOTE — DISCHARGE SUMMARY
[facility]: Inpatient Hematology / Oncology Discharge Summary Note    Patient ID:  Celestino Quesada  [id number]  35 y.o.  1990    Admit Date: 5/7/2025    Discharge Date: 5/12/2025    Admission Diagnoses: CML (chronic myeloid leukemia) (HCC) [C92.10]    Discharge Diagnoses:  Principal Diagnosis: AML (acute myeloblastic leukemia) (HCC)  Principal Problem:    Suspected new onset leukemia (HCC)  Active Problems:    Macrocytic anemia    Thrombocytosis, severe    Leukocytosis    CML (chronic myelocytic leukemia) (HCC)  Resolved Problems:    * No resolved hospital problems. *      Hospital Course:  Mr. Quesada is a 35 y.o. male admitted on 5/7/2025. The primary encounter diagnosis was Acute myeloid leukemia not having achieved remission (HCC). A diagnosis of Leukocytosis, unspecified type was also pertinent to this visit..       He has no pertinent PMH.  He presented to ED from PCP with abnormal labs.  He reports feeling weak and fatigued over the past 2 months.  Also endorses shortness of breath.  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  Reports headaches, unintentional wt loss 12# and night sweats.  WBC 405k with 10% blasts / ANC 336k / AMC 4k, Hgb 8.3, Plt 1059k.  PCT 0.32.  LDH 1271, UA 7.8.  HIV/Hep neg.  BLE dopp ?hematoma L upper medial calf.  CT head neg.  Abd US with hepatomegaly and marked splenomegaly, 22.2cm.  IR consulted for BMBx.  On Hydrea.  We were consulted for leukocytosis, anemia, and thrombocytosis.    See course of hospitalization below.  He is s/p BMBx on 5/8, path pending.  Prelim c/w CML.  PB flow with relative L-shifted myeloid hyperplasia with 3.2% CD34+ myeloblasts and relatively increased basophils detected.  BCR-ABL pending.  On Hydrea 1.5g BID.  He is being transferred to [facility] for continuation of care per pt request.     Leukocytosis / Anemia / Thrombocytosis  - HIV/Hep neg  - Abd US with hepatomegaly and marked splenomegaly  - Check BCR-ABL, PB flow,

## 2025-05-12 NOTE — CASE COMMUNICATION
[facility] accepted pt.  Awaiting bed.      Dania Pardo, LIBORIO - CNP  [facility]

## 2025-05-12 NOTE — CARE COORDINATION
Chart reviewed for continued stay and HELEN.    Pt and family would like him to be transferred to [facility]. Referral center is aware.    BMBx path is pendig    Pt expects to discharge home with his family with no anticipated needs at this time.   Will continue to follow.

## 2025-05-12 NOTE — PROGRESS NOTES
[facility] Hematology & Oncology        Inpatient Hematology / Oncology Progress Note    Reason for Consult:  CML (chronic myeloid leukemia) (HCC) [C92.10]  Referring Physician:  Ely Sanchez MD    24 Hour Events:  Afebrile, VSS  WBC down to 349k with 8% blasts  Smear suggests CML  S/p BMBx on 5/8, path pending  BCR-ABL pending  Continues on Hydrea 1G daily  Reports dyspnea improving - remains very active  Wife at bedside    Transfusions: None  Replacements: None        ROS:  Constitutional: Negative for fever, chills.  CV: Negative for chest pain, palpitations, edema.  Respiratory: +AVELAR.  Negative for cough, wheezing.  GI: Negative for nausea, abdominal pain, diarrhea.    10 point review of systems is otherwise negative with the exception of the elements mentioned above in the HPI.         Allergies   Allergen Reactions    Penicillins      History reviewed. No pertinent past medical history.  History reviewed. No pertinent surgical history.  History reviewed. No pertinent family history.  Social History     Socioeconomic History    Marital status:      Spouse name: Not on file    Number of children: Not on file    Years of education: Not on file    Highest education level: Not on file   Occupational History    Not on file   Tobacco Use    Smoking status: Never    Smokeless tobacco: Current     Types: Chew   Substance and Sexual Activity    Alcohol use: Yes     Comment: socially    Drug use: Yes     Types: Marijuana (Weed)     Comment: occasionally    Sexual activity: Not on file   Other Topics Concern    Not on file   Social History Narrative    Not on file     Social Drivers of Health     Financial Resource Strain: Not on file   Food Insecurity: No Food Insecurity (5/7/2025)    Hunger Vital Sign     Worried About Running Out of Food in the Last Year: Never true     Ran Out of Food in the Last Year: Never true   Transportation Needs: No Transportation Needs (5/7/2025)    PRAPARE - Transportation 
    [facility] Hematology & Oncology        Inpatient Hematology / Oncology Progress Note    Reason for Consult:  CML (chronic myeloid leukemia) (HCC) [C92.10]  Referring Physician:  Ely Sanchez MD    24 Hour Events:  Afebrile, VSS  WBC 321k with 8% blasts  Smear suggests CML  S/p BMBx on 5/8, path pending  BCR-ABL pending  Hydrea increased to 1G BID last night  Reports dyspnea improving - remains very active  Wife at bedside    Transfusions: None  Replacements: None        ROS:  Constitutional: Negative for fever, chills.  CV: Negative for chest pain, palpitations, edema.  Respiratory: +AVELAR.  Negative for cough, wheezing.  GI: Negative for nausea, abdominal pain, diarrhea.    10 point review of systems is otherwise negative with the exception of the elements mentioned above in the HPI.         Allergies   Allergen Reactions    Penicillins      History reviewed. No pertinent past medical history.  History reviewed. No pertinent surgical history.  History reviewed. No pertinent family history.  Social History     Socioeconomic History    Marital status:      Spouse name: Not on file    Number of children: Not on file    Years of education: Not on file    Highest education level: Not on file   Occupational History    Not on file   Tobacco Use    Smoking status: Never    Smokeless tobacco: Current     Types: Chew   Substance and Sexual Activity    Alcohol use: Yes     Comment: socially    Drug use: Yes     Types: Marijuana (Weed)     Comment: occasionally    Sexual activity: Not on file   Other Topics Concern    Not on file   Social History Narrative    Not on file     Social Drivers of Health     Financial Resource Strain: Not on file   Food Insecurity: No Food Insecurity (5/7/2025)    Hunger Vital Sign     Worried About Running Out of Food in the Last Year: Never true     Ran Out of Food in the Last Year: Never true   Transportation Needs: No Transportation Needs (5/7/2025)    PRAPARE - Transportation 
    [facility] Hematology & Oncology        Inpatient Hematology / Oncology Progress Note    Reason for Consult:  CML (chronic myeloid leukemia) (HCC) [C92.10]  Referring Physician:  Ely Sanchez MD    24 Hour Events:  Afebrile, VSS  WBC down to 294k  Smear suggests CML  S/p BMBx on 5/8, path pending  BCR-ABL pending  Hydrea increased to 1.5g BID today  Reports dyspnea improving - remains very active  Family at bedside    Transfusions: None  Replacements: None        ROS:  Constitutional: Negative for fever, chills.  CV: Negative for chest pain, palpitations, edema.  Respiratory: +AVELAR (improved).  Negative for cough, wheezing.  GI: Negative for nausea, abdominal pain, diarrhea.    10 point review of systems is otherwise negative with the exception of the elements mentioned above in the HPI.         Allergies   Allergen Reactions    Penicillins      History reviewed. No pertinent past medical history.  History reviewed. No pertinent surgical history.  History reviewed. No pertinent family history.  Social History     Socioeconomic History    Marital status:      Spouse name: Not on file    Number of children: Not on file    Years of education: Not on file    Highest education level: Not on file   Occupational History    Not on file   Tobacco Use    Smoking status: Never    Smokeless tobacco: Current     Types: Chew   Substance and Sexual Activity    Alcohol use: Yes     Comment: socially    Drug use: Yes     Types: Marijuana (Weed)     Comment: occasionally    Sexual activity: Not on file   Other Topics Concern    Not on file   Social History Narrative    Not on file     Social Drivers of Health     Financial Resource Strain: Not on file   Food Insecurity: No Food Insecurity (5/7/2025)    Hunger Vital Sign     Worried About Running Out of Food in the Last Year: Never true     Ran Out of Food in the Last Year: Never true   Transportation Needs: No Transportation Needs (5/7/2025)    PRAPARE - 
0744- Patient and patient's wife want patient to be transferred to [facility]. rAcelia Houston, BENNETT with oncology aware of patient's wishes.   
4 Eyes Skin Assessment     NAME:  Celestino Quesada  YOB: 1990  MEDICAL RECORD NUMBER:  [id number]    The patient is being assessed for  Admission    I agree that at least one RN has performed a thorough Head to Toe Skin Assessment on the patient. ALL assessment sites listed below have been assessed.      Areas assessed by both nurses:    Head, Face, Ears, Shoulders, Back, Chest, Arms, Elbows, Hands, Sacrum. Buttock, Coccyx, Ischium, and Legs. Feet and Heels        Does the Patient have a Wound? No noted wound(s)       Ihsan Prevention initiated by RN: Yes  Wound Care Orders initiated by RN: No    Pressure Injury (Stage 3,4, Unstageable, DTI, NWPT, and Complex wounds) if present, place Wound referral order by RN under : No    New Ostomies, if present place, Ostomy referral order under : No     Nurse 1 eSignature: Electronically signed by Chapis Riley RN on 5/8/25 at 1:11 AM EDT    **SHARE this note so that the co-signing nurse can place an eSignature**    Nurse 2 eSignature: {Esignature:[id number]}   
EOS notes:    Last night pt & wife expressed that they decided that they will transfer to [facility];nothing against the staff or hospital per both;they apparently have family/friends that work there. Advised to bring this up to the provider in the AM & they agreed that is their plan.  
EOS notes:    No new complaints overnight.    
Pt & family wanted to know why Hydrea 500mg is not being given tonight as previously planned. Order has been held by Dr. Sanchez. NP on call Arcelia contacted;not sure either why;advised to tell pt/family that rounding Physician Dr. Sanchez will be in tomorrow & will get clarification from her then. Pt & family verbalized understanding/agreement.     0630 No new complaints overnight.    
TRANSFER - IN REPORT:    Verbal report received from Cony Tellez RN on Celestino Phibbs  being received from ED for routine progression of patient care      Report consisted of patient's Situation, Background, Assessment and   Recommendations(SBAR).     Information from the following report(s) Nurse Handoff Report, Index, ED Encounter Summary, ED SBAR, Adult Overview, Intake/Output, MAR, and Recent Results was reviewed with the receiving nurse.    Opportunity for questions and clarification was provided.      Assessment completed upon patient's arrival to unit and care assumed.    
TRANSFER - OUT REPORT:    Verbal report given to Arcelia THOMAS on Celestino Quesada  being transferred to [facility] for routine progression of patient care       Report consisted of patient's Situation, Background, Assessment and   Recommendations(SBAR).     Information from the following report(s) Nurse Handoff Report, Intake/Output, and MAR was reviewed with the receiving nurse.           Lines:   Peripheral IV 05/07/25 Left Antecubital (Active)   Site Assessment Clean, dry & intact 05/08/25 0745   Line Status Infusing 05/08/25 0745   Line Care Connections checked and tightened;Ports disinfected 05/08/25 0745   Phlebitis Assessment No symptoms 05/08/25 0745   Infiltration Assessment 0 05/08/25 0745   Alcohol Cap Used Yes 05/08/25 0745   Dressing Status Clean, dry & intact 05/08/25 0745   Dressing Type Transparent 05/08/25 0745   Dressing Intervention New 05/07/25 7744        Opportunity for questions and clarification was provided.      Patient transported with:  Tech       
Problems:  Principal Problem:    Suspected new onset leukemia (HCC)  Active Problems:    Macrocytic anemia    Thrombocytosis, severe  Resolved Problems:    * No resolved hospital problems. *      Objective:   Patient Vitals for the past 24 hrs:   Temp Pulse Resp BP SpO2   05/08/25 0308 98.1 °F (36.7 °C) 85 16 126/73 95 %   05/07/25 2321 99.1 °F (37.3 °C) (!) 106 18 (!) 146/85 96 %   05/07/25 2100 98.6 °F (37 °C) 97 16 135/72 94 %       Oxygen Therapy  SpO2: 95 %  O2 Device: None (Room air)    Estimated body mass index is 25.77 kg/m² as calculated from the following:    Height as of this encounter: 1.778 m (5' 10\").    Weight as of this encounter: 81.5 kg (179 lb 9.6 oz).    Intake/Output Summary (Last 24 hours) at 5/8/2025 0756  Last data filed at 5/8/2025 0417  Gross per 24 hour   Intake 670.99 ml   Output --   Net 670.99 ml         Physical Exam:   General:    Well nourished and well-appearing adult male reclining comfortably in bed.  Head:  Normocephalic, atraumatic.  Eyes:  Sclerae appear normal.  Pupils equally round.  ENT:  Nares appear normal.  Moist oral mucosa.  Neck:  No restricted ROM.  Trachea midline.   CV:   RRR.  No m/r/g.  No jugular venous distension.  No LE edema.  Lungs:   CTAB.  No wheezing, rhonchi, or rales.  Symmetric expansion.  On room air.  Abdomen:   Nondistended, soft, nontender to palpation.  No hepatosplenomegaly.  Skin:     No rashes.  Normal coloration.   Warm and dry.    Neuro:  AAOx3.  CN II-XII grossly intact.    Psych:  Normal mood and affect.      I have personally reviewed labs and tests:  Recent Labs:  Recent Results (from the past 48 hours)   CBC with Auto Differential    Collection Time: 05/07/25  3:39 PM   Result Value Ref Range    .6 (HH) 4.3 - 11.1 K/uL    RBC 2.35 (L) 4.23 - 5.6 M/uL    Hemoglobin 8.3 (L) 13.6 - 17.2 g/dL    Hematocrit 24.0 (L) 41.1 - 50.3 %    .1 (H) 82.0 - 102.0 FL    MCH 35.3 (H) 26.1 - 32.9 PG    MCHC 34.6 31.4 - 35.0 g/dL    RDW 17.2 
107 mmol/L    CO2 24 20 - 29 mmol/L    Anion Gap 12 7 - 16 mmol/L    Glucose 84 70 - 99 mg/dL    BUN 15 6 - 23 MG/DL    Creatinine 0.96 0.80 - 1.30 MG/DL    Est, Glom Filt Rate >90 >60 ml/min/1.73m2    Calcium 9.2 8.8 - 10.2 MG/DL    Total Bilirubin 0.3 0.0 - 1.2 MG/DL    ALT 14 8 - 55 U/L    AST 25 15 - 37 U/L    Alk Phosphatase 80 40 - 129 U/L    Total Protein 6.5 6.3 - 8.2 g/dL    Albumin 3.5 3.5 - 5.0 g/dL    Globulin 3.0 2.3 - 3.5 g/dL    Albumin/Globulin Ratio 1.2 1.0 - 1.9     Uric Acid    Collection Time: 05/09/25  4:59 AM   Result Value Ref Range    Uric Acid 6.3 3.9 - 8.2 MG/DL   Phosphorus    Collection Time: 05/09/25  4:59 AM   Result Value Ref Range    Phosphorus 4.2 2.5 - 4.5 MG/DL   Protime-INR    Collection Time: 05/09/25  4:59 AM   Result Value Ref Range    Protime 15.3 (H) 11.3 - 14.9 sec    INR 1.1     APTT    Collection Time: 05/09/25  4:59 AM   Result Value Ref Range    APTT 37.5 (H) 23.3 - 37.4 SEC   Fibrinogen    Collection Time: 05/09/25  4:59 AM   Result Value Ref Range    Fibrinogen 367 190 - 501 mg/dL   D-Dimer, Quantitative    Collection Time: 05/09/25  4:59 AM   Result Value Ref Range    D-Dimer, Quant <0.27 <0.56 ug/ml(FEU)       Imaging:  US Result (most recent):  US ABDOMEN COMPLETE 05/08/2025    [facility]  ABDOMINAL  ULTRASOUND    INDICATION: ? CML, anemia, thrombocytosis    COMPARISON: None    TECHNIQUE: Transabdominal imaging of the upper abdomen was performed.    FINDINGS:  LIVER: 22.2 cm.  Normal echogenicity.  No masses.  BILE DUCTS: No intrahepatic bile duct dilatation.  CBD diameter = 3.5 mm.  GALLBLADDER: No gallstones identified. Gallbladder sludge noted. Negative  sonographic Palacio sign. No gallbladder wall thickening noted.  PANCREAS: Normal.  SPLEEN: The spleen measures 22.2 cm in length.    RIGHT KIDNEY: 13.6 cm.  No mass or hydronephrosis.  LEFT KIDNEY: 15.2 cm.  No mass or hydronephrosis.  ABDOMINAL AORTA AND IVC: Normal in size.  ASCITES: No free

## 2025-05-12 NOTE — PLAN OF CARE
Problem: Discharge Planning  Goal: Discharge to home or other facility with appropriate resources  5/12/2025 0743 by Emelina Haines RN  Outcome: Progressing  5/11/2025 2110 by Shelly Nice RN  Outcome: Progressing  Flowsheets (Taken 5/11/2025 2000)  Discharge to home or other facility with appropriate resources: Identify barriers to discharge with patient and caregiver     Problem: Pain  Goal: Verbalizes/displays adequate comfort level or baseline comfort level  5/12/2025 0743 by Emelina Haines RN  Outcome: Progressing  5/11/2025 2110 by Shelly Nice RN  Outcome: Progressing  Flowsheets (Taken 5/11/2025 2000)  Verbalizes/displays adequate comfort level or baseline comfort level:   Encourage patient to monitor pain and request assistance   Assess pain using appropriate pain scale   Administer analgesics based on type and severity of pain and evaluate response   Implement non-pharmacological measures as appropriate and evaluate response

## 2025-05-12 NOTE — FLOWSHEET NOTE
1452- Discharge paperwork and education given to patient. All questions answered to the best of my ability. Room 5240 at [facility] information relayed to patient.     3636- EMTALA signed by patient, this RN, and oncology NP, Dania Pardo.

## 2025-05-13 LAB
INTERPRETATION: NORMAL
LAB DIRECTOR NAME PROVIDER: NORMAL
LABORATORY COMMENT REPORT: NORMAL
T(ABL1,BCR)B2A2/CONTROL BLD/T: NORMAL
T(ABL1,BCR)B2A2/CONTROL BLD/T: NORMAL %
T(ABL1,BCR)B3A2/CONTROL BLD/T: NORMAL %
T(ABL1,BCR)E1A2/CONTROL BLD/T: NORMAL
T(ABL1,BCR)E1A2/CONTROL BLD/T: NORMAL %

## 2025-05-16 LAB
FLOW CYTOMETRY RESULTS: NORMAL
SPECIMEN SOURCE: NORMAL
TEST ORDERED: NORMAL
